# Patient Record
Sex: MALE | Race: WHITE | Employment: FULL TIME | URBAN - METROPOLITAN AREA
[De-identification: names, ages, dates, MRNs, and addresses within clinical notes are randomized per-mention and may not be internally consistent; named-entity substitution may affect disease eponyms.]

---
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
## 2019-03-11 DIAGNOSIS — E78.00 HYPERCHOLESTEROLEMIA: Primary | ICD-10-CM

## 2019-03-12 RX ORDER — ATORVASTATIN CALCIUM 20 MG/1
20 TABLET, FILM COATED ORAL DAILY
Qty: 30 TAB | Refills: 0 | Status: SHIPPED | OUTPATIENT
Start: 2019-03-12 | End: 2019-10-04

## 2019-05-01 ENCOUNTER — DOCUMENTATION ONLY (OUTPATIENT)
Dept: FAMILY MEDICINE CLINIC | Age: 19
End: 2019-05-01

## 2019-05-01 NOTE — PROGRESS NOTES
I reviewed and summarized note faxed from [address] [place] [address] ( DR. Wade Bolivar) on 4/11/19  Diffuse worsening muscle and joint pain: much better than before on Lyrica 75 mg bid. He recommended to also continue Amytriptilline 50 mg every day and Adderral  JOSE may always remain positive. F/u in 4 months    Concha Wyatt MD   05/01/19  3:04 PM

## 2019-07-05 ENCOUNTER — TELEPHONE (OUTPATIENT)
Dept: FAMILY MEDICINE CLINIC | Age: 19
End: 2019-07-05

## 2019-07-05 ENCOUNTER — TELEPHONE (OUTPATIENT)
Dept: CARDIOLOGY CLINIC | Age: 19
End: 2019-07-05

## 2019-07-05 DIAGNOSIS — E78.00 HYPERCHOLESTEROLEMIA: Primary | ICD-10-CM

## 2019-07-05 NOTE — TELEPHONE ENCOUNTER
Patient states he is due to have lipids lab work and has scheduled an appt for 8/23/19. He has asked if a lab order can be mailed to him (address confirmed).      Phone: [phone]

## 2019-07-05 NOTE — TELEPHONE ENCOUNTER
Spoke with patient who was informed an appointment was required as not seen here in the past year. Wednesday, July 10, 2019 08:00 AM  Dr. Holger Perez.

## 2019-07-05 NOTE — TELEPHONE ENCOUNTER
----- Message from Jeannie Guillen sent at 7/5/2019 12:15 PM EDT -----  Regarding: DR Sarita Platt / Dona Loaiza father is requesting a referral for an upcoming appt with:  Dr. Silvestre Arnav on 8/23/19 @ 9 am    [facility] [address] [id number]  p [phone]      Best contact: [phone]

## 2019-07-08 NOTE — TELEPHONE ENCOUNTER
Patient has upcoming appointment with Dr Holger Perez. ... Referral order to be entered for Cardiology after visit on July 10, 2019. ... Dr Holger Perez, please enter into [facility] for a yearly cardiology consult. ...

## 2019-07-10 ENCOUNTER — OFFICE VISIT (OUTPATIENT)
Dept: FAMILY MEDICINE CLINIC | Age: 19
End: 2019-07-10

## 2019-07-10 VITALS
WEIGHT: 208.2 LBS | RESPIRATION RATE: 16 BRPM | OXYGEN SATURATION: 99 % | BODY MASS INDEX: 33.46 KG/M2 | SYSTOLIC BLOOD PRESSURE: 135 MMHG | HEIGHT: 66 IN | DIASTOLIC BLOOD PRESSURE: 87 MMHG | HEART RATE: 79 BPM | TEMPERATURE: 98 F

## 2019-07-10 DIAGNOSIS — K90.41 GLUTEN INTOLERANCE: ICD-10-CM

## 2019-07-10 DIAGNOSIS — E78.5 DYSLIPIDEMIA: ICD-10-CM

## 2019-07-10 DIAGNOSIS — I10 HYPERTENSION, ESSENTIAL: Primary | ICD-10-CM

## 2019-07-10 RX ORDER — PANTOPRAZOLE SODIUM 20 MG/1
20 TABLET, DELAYED RELEASE ORAL DAILY
COMMUNITY

## 2019-07-10 RX ORDER — EPINEPHRINE 0.3 MG/.3ML
0.3 INJECTION SUBCUTANEOUS
Qty: 1 SYRINGE | Refills: 1 | Status: SHIPPED | OUTPATIENT
Start: 2019-07-10 | End: 2020-03-30 | Stop reason: SDUPTHER

## 2019-07-10 NOTE — PROGRESS NOTES
Identified Patient with two Patient identifiers (Name and ). Two Patient Identifiers confirmed. Reviewed record in preparation for visit and have obtained necessary documentation. Chief Complaint   Patient presents with    Referral Request     Referral to Cardiology - Dr. Addie Johnston Medication     Patient requesting prescription for Epi Pen due to Gluten Allergy        Visit Vitals  /87 (BP 1 Location: Left arm, BP Patient Position: Sitting)   Pulse 79   Temp 98 °F (36.7 °C) (Oral)   Resp 16   Ht 5' 6\" (1.676 m)   Wt 208 lb 3.2 oz (94.4 kg)   SpO2 99%   BMI 33.60 kg/m²       1. Have you been to the ER, urgent care clinic since your last visit? Hospitalized since your last visit? No    2. Have you seen or consulted any other health care providers outside of the [address] [place] [address] since your last visit? Include any pap smears or colon screening.  No

## 2019-07-10 NOTE — PROGRESS NOTES
Progress Note    Patient: Marisa Goodell MRN: [id number]  SSN: [id number]    YOB: 2000  Age: 20 y.o. Sex: male      Chief Complaint   Patient presents with    Referral Request     Referral to Cardiology - Dr. Amanda Kinney Medication     Patient requesting prescription for Epi Pen due to Gluten Allergy      Subjective:     Problems addressed:  Encounter Diagnoses     ICD-10-CM ICD-9-CM   1. Hypertension, essential I10 401.9   2. Dyslipidemia E78.5 272.4   3. Gluten intolerance K90.41 579.0     Pt is a  19 y/o M with HTN, HLD and ADHD who is seen in clinic today for Cardiology referral.   Pt was initially seen by a pediatric cardiologist for 5 years and then established care with Dr. Gilmer Snell.  Last office visit was 2/1/18: Personally reviewed office visit. HTN: On Lisinopril 2.5mg daily. Home BPs have been <711 systolic. Pt is compliant with his medications. Pt denies HA, dizziness, SOB, CP and LE edema. Dyslipidemia: LDL: 179 (2/2018). Currently Lipitor 20 mg. Tolerating medication well without myalgias. Gluten Allergies: Pt states that he had an anaphylactic reaction to gluten allergy. Had to receive treatment with EPI in the ER per Pt. Would like an epi-pen. Had colonoscopy in 6/2017 with Dr. Miriam Linder due to GI symptoms. Stopped eating gluten and his GI symptoms resolved. Current and past medical information:    Current Medications after this visit[de-identified]     Current Outpatient Medications   Medication Sig    pantoprazole (PROTONIX) 20 mg tablet Take 20 mg by mouth daily.  EPINEPHrine (EPIPEN) 0.3 mg/0.3 mL injection 0.3 mL by IntraMUSCular route once as needed for Anaphylaxis for up to 1 dose.  atorvastatin (LIPITOR) 20 mg tablet Take 1 Tab by mouth daily.  traMADol (ULTRAM) 50 mg tablet Take 1 Tab by mouth every eight (8) hours as needed for Pain for up to 10 doses.  Max Daily Amount: 150 mg.    dextroamphetamine-amphetamine (ADDERALL) 30 mg tablet Take 30 mg by mouth daily.  pregabalin (LYRICA) 25 mg capsule Take 25 mg by mouth two (2) times a day.  FEXOFENADINE HCL (ALLEGRA PO) Take  by mouth.  TREXIMET  mg tablet TK 1 T PO D PRN    amitriptyline (ELAVIL) 50 mg tablet Take 1 Tab by mouth nightly.  FENOFIBRATE PO Take  by mouth.  zonisamide (ZONEGRAN) 50 mg capsule Take 200 mg by mouth daily.  apremilast (OTEZLA) 30 mg tab Take 30 mg by mouth two (2) times a day.  lisinopril (PRINIVIL, ZESTRIL) 2.5 mg tablet Take 2.5 mg by mouth daily. No current facility-administered medications for this visit. Patient Active Problem List    Diagnosis Date Noted    Hypertension, essential 02/21/2018    Fibromyalgia 02/21/2018    IBS (irritable bowel syndrome)     Body aches 05/18/2017    Mild single current episode of major depressive disorder ([facility] [address] 05/18/2017       Past Medical History:   Diagnosis Date    ADHD (attention deficit hyperactivity disorder)     Fibromyalgia     GERD (gastroesophageal reflux disease)     Hypercholesteremia     Hypertension, essential 2/21/2018    IBS (irritable bowel syndrome)     Migraine     Psoriasis        Allergies   Allergen Reactions    Gluten Anaphylaxis    Topamax [Topiramate] Anaphylaxis    Zomig [Zolmitriptan] Shortness of Breath       Past Surgical History:   Procedure Laterality Date    COLONOSCOPY N/A 5/9/2017    COLONOSCOPY performed by Marilyne Sandifer, MD at [address] [place] [address] HX HEENT      wisdom teeth       Social History     Socioeconomic History    Marital status: SINGLE     Spouse name: Not on file    Number of children: Not on file    Years of education: Not on file    Highest education level: Not on file   Tobacco Use    Smoking status: Never Smoker    Smokeless tobacco: Never Used   Substance and Sexual Activity    Alcohol use: No    Drug use: No    Sexual activity: Never         Review of Systems   Constitutional: Negative for chills and fever.    Eyes: Negative for blurred vision. Respiratory: Negative for cough and shortness of breath. Cardiovascular: Negative for chest pain and palpitations. Gastrointestinal: Negative for abdominal pain, constipation, diarrhea, nausea and vomiting. Neurological: Negative for dizziness and headaches. Objective:     Vitals:    07/10/19 0803   BP: 135/87   Pulse: 79   Resp: 16   Temp: 98 °F (36.7 °C)   TempSrc: Oral   SpO2: 99%   Weight: 208 lb 3.2 oz (94.4 kg)   Height: 5' 6\" (1.676 m)      Body mass index is 33.6 kg/m². Physical Exam   Constitutional: He appears well-developed and well-nourished. No distress. Cardiovascular: Normal rate and regular rhythm. Exam reveals no gallop and no friction rub. No murmur heard. Pulmonary/Chest: Effort normal and breath sounds normal.   Skin: He is not diaphoretic. Health Maintenance Due   Topic Date Due    DTaP/Tdap/Td series (1 - Tdap) 02/05/2006    HPV Age 9Y-34Y (3 - Male 3-dose series) 02/05/2014     Assessment and orders:     Encounter Diagnoses     ICD-10-CM ICD-9-CM   1. Hypertension, essential I10 401.9   2. Dyslipidemia E78.5 272.4   3. Gluten intolerance K90.41 579.0     1. Hypertension, essential: Controlled and at goal in clinic today. Continue  Lisinopril 2.5mg daily.  - Follow up with cardiology as scheduled. - REFERRAL TO CARDIOLOGY    2. Dyslipidemia: LDL: 179 (2/2018). - Continue Lipitor 20 mg    3. Gluten intolerance: Unclear if Pt had an actual anaphylactic reaction but will prescribe Epipen as Pt is requesting.   - EPINEPHrine (EPIPEN) 0.3 mg/0.3 mL injection; 0.3 mL by IntraMUSCular route once as needed for Anaphylaxis for up to 1 dose. Plan of care:  Discussed diagnoses in detail with patient. Medication risks/benefits/side effects discussed with patient. All of the patient's questions were addressed. The patient understands and agrees with our plan of care.     The patient knows to call back if they are unsure of or forget any changes we discussed today or if the symptoms change. The patient received an After-Visit Summary which contains VS, orders, medication list and allergy list. This can be used as a \"mini-medical record\" should they have to seek medical care while out of town.       Future Appointments   Date Time Provider Conrad Delores   8/23/2019  9:00 AM Lise Gresham  [address]       Signed By: Aurora Aguayo MD     July 10, 2019      Pt discussed with Dr. Brent Borges

## 2019-07-18 LAB
ALBUMIN SERPL-MCNC: 4.6 G/DL (ref 3.5–5.5)
ALP SERPL-CCNC: 71 IU/L (ref 39–117)
ALT SERPL-CCNC: 40 IU/L (ref 0–44)
AST SERPL-CCNC: 21 IU/L (ref 0–40)
BILIRUB DIRECT SERPL-MCNC: 0.14 MG/DL (ref 0–0.4)
BILIRUB SERPL-MCNC: 0.8 MG/DL (ref 0–1.2)
CHOLEST SERPL-MCNC: 264 MG/DL (ref 100–199)
HDLC SERPL-MCNC: 28 MG/DL
INTERPRETATION, 910389: NORMAL
LDLC SERPL CALC-MCNC: 204 MG/DL (ref 0–99)
PROT SERPL-MCNC: 6.9 G/DL (ref 6–8.5)
TRIGL SERPL-MCNC: 158 MG/DL (ref 0–149)
VLDLC SERPL CALC-MCNC: 32 MG/DL (ref 5–40)

## 2019-07-18 NOTE — TELEPHONE ENCOUNTER
Numbers remain high  Has appt to discuss  Future Appointments  8/23/2019  9:00 AM    Corrinne Key, MD [address]

## 2019-08-27 ENCOUNTER — HOSPITAL ENCOUNTER (EMERGENCY)
Age: 19
Discharge: HOME | End: 2019-08-27
Attending: EMERGENCY MEDICINE
Payer: OTHER GOVERNMENT

## 2019-08-27 VITALS
TEMPERATURE: 98.5 F | RESPIRATION RATE: 20 BRPM | BODY MASS INDEX: 33.43 KG/M2 | HEIGHT: 66 IN | OXYGEN SATURATION: 98 % | HEART RATE: 82 BPM | WEIGHT: 208 LBS | DIASTOLIC BLOOD PRESSURE: 93 MMHG | SYSTOLIC BLOOD PRESSURE: 142 MMHG
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
## 2019-08-27 PROCEDURE — 99281 EMR DPT VST MAYX REQ PHY/QHP: CPT
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
## 2019-08-27 NOTE — LETTER
[address] [place] [address] Dr 
[facility] EMERGENCY DEPT 
[address] [facility] [id number] [phone] Work/School Note Date: 8/27/2019 To Whom It May concern: Vianey Gosselin was seen and treated today in the emergency room by the following provider(s): 
Attending Provider: Sulma Anton MD.   
// 
Jody Gosselin may return to work on 8/30/19. Sincerely, Lyndsey Michel MD 
 
 
/
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
## 2019-08-27 NOTE — ED NOTES
I have reviewed discharge instructions with the patient and parent. The patient and parent verbalized understanding instructions and need for follow up with primary care provider. Pt is not in any current distress and shows no evidence of clinical instability. Pt left ambulatory. Paperwork given and reviewed with patient and their parent, opportunity for questions/clarification given.      Visit Vitals  BP (!) 142/93 (BP 1 Location: Right arm, BP Patient Position: At rest)   Pulse 82   Temp 98.5 °F (36.9 °C)   Resp 20   Ht 5' 6\" (1.676 m)   Wt 94.3 kg (208 lb)   SpO2 98%   BMI 33.57 kg/m²

## 2019-08-27 NOTE — DISCHARGE INSTRUCTIONS

## 2019-10-04 ENCOUNTER — OFFICE VISIT (OUTPATIENT)
Dept: CARDIOLOGY CLINIC | Age: 19
End: 2019-10-04

## 2019-10-04 VITALS
DIASTOLIC BLOOD PRESSURE: 100 MMHG | RESPIRATION RATE: 16 BRPM | OXYGEN SATURATION: 97 % | HEIGHT: 66 IN | BODY MASS INDEX: 35.1 KG/M2 | SYSTOLIC BLOOD PRESSURE: 130 MMHG | WEIGHT: 218.4 LBS | HEART RATE: 96 BPM

## 2019-10-04 DIAGNOSIS — E78.79 FAMILIAL HYPERCHOLANEMIA: ICD-10-CM

## 2019-10-04 DIAGNOSIS — R00.0 TACHYCARDIA: ICD-10-CM

## 2019-10-04 DIAGNOSIS — M79.7 FIBROMYALGIA: ICD-10-CM

## 2019-10-04 DIAGNOSIS — I10 HYPERTENSION, ESSENTIAL: ICD-10-CM

## 2019-10-04 DIAGNOSIS — R07.2 SUBSTERNAL PRECORDIAL CHEST PAIN: ICD-10-CM

## 2019-10-04 DIAGNOSIS — E78.00 HYPERCHOLESTEROLEMIA: Primary | ICD-10-CM

## 2019-10-04 RX ORDER — ATORVASTATIN CALCIUM 40 MG/1
40 TABLET, FILM COATED ORAL DAILY
Qty: 90 TAB | Refills: 3 | Status: SHIPPED | OUTPATIENT
Start: 2019-10-04 | End: 2020-11-02 | Stop reason: SDUPTHER

## 2019-10-04 NOTE — PATIENT INSTRUCTIONS
Please increase your Liptor to 40mg daily. You may take 2 of your 20mg tablets until you get your new prescription. Please check your blood pressure 3 days a month and follow up with your PCP for your cholesterol and BP. When you check your blood pressure three times a month do it at different times. Be sure to make sure you are sitting still for at least five minutes with both feet flat on the floor and arm heart level. Please write down the blood pressure, heart rate, date and time. Bring the recording to your next PCP appointment.

## 2019-10-04 NOTE — PROGRESS NOTES
Vin Hayes     2000       Vipal K. Merlin Mantis MD, [facility] [place]  Date of Visit-10/4/2019   PCP is Yves Pulido MD   [facility] [place]  [facility]  HPI:  Vin Hayes is a 20 y.o. male   F/u of HTN and hypercholesterolemia. Has transferred from his pediatric cardiologist but then has not seen us in awhile and missed appts  Was in [address]- and reminded to fu   Has been off some of his medications now as compared to last visit  Prior stress test in 2019 was normal, done because of chest pain. Previous lipids have been markedly elevated. Has a hx of fibromyalgia. Not seen since since February 2018. Lipids as below:  Lab Results   Component Value Date/Time    Cholesterol, total 264 (H) 07/17/2019 10:35 AM    HDL Cholesterol 28 (L) 07/17/2019 10:35 AM    LDL, calculated 204 (H) 07/17/2019 10:35 AM    VLDL, calculated 32 07/17/2019 10:35 AM    Triglyceride 158 (H) 07/17/2019 10:35 AM     Lab Results   Component Value Date/Time    Cholesterol, total 264 (H) 07/17/2019 10:35 AM    Cholesterol, total 242 (H) 08/09/2018 01:02 PM    HDL Cholesterol 28 (L) 07/17/2019 10:35 AM    HDL Cholesterol 36 (L) 08/09/2018 01:02 PM    LDL, calculated 204 (H) 07/17/2019 10:35 AM    LDL, calculated 179 (H) 08/09/2018 01:02 PM    Triglyceride 158 (H) 07/17/2019 10:35 AM    Triglyceride 136 (H) 08/09/2018 01:02 PM     Pt reports that he has been feeling well. He notes that he occasionally checks his blood pressure at home with readings around 120. He has been off some of his anxiety meds with direction of physicians but also now off bp meds and not clear to me if with his own choice or physician direction  His anxiety level seems lower, he is working at [employer] and is now active  Denies chest pain, edema, syncope or shortness of breath at rest   Has no tachycardia , palpitations or sense of arrythmia     EKG: NSR WNL. Assessment/Plan:     1. Hypercholesterolemia  Increase lipitor from 20 to 40mg . He has a likely familial dyslipidemia likely. I will have him see Maurizio Dacosta MD and then can be referred back if he does not have improvement in LDL as his statin is increased. I would suggest that he work with his PCP to increase the Lipitor or even change to Crestor to get an LDL less than 130 . Either he gets to an LDL below 130 or if he is maxed out on high does Crestor I will see him if he is not at goal to discuss PCSK9 inhibitors    family hx of MI (great aunt) no close relatives, he also has a family hx of hypercholesteremia. 2. Hypertension, essential  BP has improved. No longer on meds. I've asked him to take his BP 3 times a month with a goal of 424 mmHg systolic or less. - AMB POC EKG ROUTINE W/ 12 LEADS, INTER & REP    3. Chest pain, referred by Dr. Svetlana Bradford from [facility] for chest wall pain, seen  with normal stress echo  4. ADHD , keep fu with PCP  Has mild tachycardia with Adderall  5,. Hx of fibromyalgia, likely source of previous chest pain    F/u as outlined above PRN but happy to see back from PCP for any issue but especially if unable to get LDL <130. Patient Instructions   Please increase your Liptor to 40mg daily. You may take 2 of your 20mg tablets until you get your new prescription. Please check your blood pressure 3 days a month and follow up with your PCP for your cholesterol and BP. When you check your blood pressure three times a month do it at different times. Be sure to make sure you are sitting still for at least five minutes with both feet flat on the floor and arm heart level. Please write down the blood pressure, heart rate, date and time. Bring the recording to your next PCP appointment. Key CAD CHF Meds             atorvastatin (LIPITOR) 40 mg tablet (Taking) Take 1 Tab by mouth daily. lisinopril (PRINIVIL, ZESTRIL) 2.5 mg tablet (Taking) Take 2.5 mg by mouth daily. Impression:   1. Hypercholesterolemia    2. Hypertension, essential    3. Substernal precordial chest pain    4. Familial hypercholanemia    5. Fibromyalgia    6. Tachycardia       Cardiac History:   No specialty comments available. ROS-except as noted above. . A complete cardiac and respiratory are reviewed and negative except as above ; Resp-denies wheezing  or productive cough,. Const- No unusual weight loss or fever; Neuro-no recent seizure or CVA ; GI- No BRBPR, abdom pain, bloating ; - no  hematuria   see supplement sheet, initialed and to be scanned by staff  Past Medical History:   Diagnosis Date    ADHD (attention deficit hyperactivity disorder)     Fibromyalgia     GERD (gastroesophageal reflux disease)     Hypercholesteremia     Hypertension, essential 2/21/2018    IBS (irritable bowel syndrome)     Migraine     Psoriasis       Social Hx= reports that he has never smoked. He has never used smokeless tobacco. He reports that he does not drink alcohol or use drugs. Exam and Labs:    Visit Vitals  BP (!) 130/100 (BP 1 Location: Left arm, BP Patient Position: Sitting)   Pulse 96   Resp 16   Ht 5' 6\" (1.676 m)   Wt 218 lb 6.4 oz (99.1 kg)   SpO2 97%   BMI 35.25 kg/m²    @Constitutional:  NAD, comfortable  Head: NC,AT. Eyes: No scleral icterus. Neck:  Neck supple. No JVD present. Throat: moist mucous membranes. Chest: Effort normal & normal respiratory excursion . Neurological: alert, conversant and oriented . Skin: Skin is not cold. No obvious systemic rash noted. Not diaphoretic. No erythema. Psychiatric:  Grossly normal mood and affect. Behavior appears normal. Extremities:  no clubbing or cyanosis. Abdomen: non distended    Lungs:  breath sounds normal. No stridor. distress, wheezes or  Rales. Heart:  normal rate, regular rhythm, normal S1, S2, no murmurs, rubs, clicks or gallops, PMI non displaced. Edema:  Edema is none.   Lab Results   Component Value Date/Time    Cholesterol, total 264 (H) 07/17/2019 10:35 AM    HDL Cholesterol 28 (L) 07/17/2019 10:35 AM    LDL, calculated 204 (H) 07/17/2019 10:35 AM    Triglyceride 158 (H) 07/17/2019 10:35 AM     Lab Results   Component Value Date/Time    Sodium 142 03/25/2018 10:15 AM    Potassium 4.2 03/25/2018 10:15 AM    Chloride 107 03/25/2018 10:15 AM    CO2 27 03/25/2018 10:15 AM    Anion gap 8 03/25/2018 10:15 AM    Glucose 88 03/25/2018 10:15 AM    BUN 20 03/25/2018 10:15 AM    Creatinine 1.22 03/25/2018 10:15 AM    BUN/Creatinine ratio 16 03/25/2018 10:15 AM    GFR est AA >60 03/25/2018 10:15 AM    GFR est non-AA >60 03/25/2018 10:15 AM    Calcium 8.9 03/25/2018 10:15 AM      Wt Readings from Last 3 Encounters:   10/04/19 218 lb 6.4 oz (99.1 kg)   08/27/19 208 lb (94.3 kg)   07/10/19 208 lb 3.2 oz (94.4 kg)      BP Readings from Last 3 Encounters:   10/04/19 (!) 130/100   08/27/19 (!) 142/93   07/10/19 135/87      Current Outpatient Medications   Medication Sig    cholecalciferol, vitamin D3, (VITAMIN D3 PO) Take 1 Tab by mouth daily.  pantoprazole (PROTONIX) 20 mg tablet Take 20 mg by mouth daily.  atorvastatin (LIPITOR) 20 mg tablet Take 1 Tab by mouth daily.  pregabalin (LYRICA) 25 mg capsule Take 25 mg by mouth two (2) times a day. As of 10-4-19,taking 50 mg bid.  TREXIMET  mg tablet TK 1 T PO D PRN    FEXOFENADINE HCL (ALLEGRA PO) Take 1 Tab by mouth daily as needed.  lisinopril (PRINIVIL, ZESTRIL) 2.5 mg tablet Take 2.5 mg by mouth daily.  dextroamphetamine-amphetamine (ADDERALL) 30 mg tablet Take 30 mg by mouth daily. No current facility-administered medications for this visit. Impression see above.      Written by Amber Galvez, as dictated by Ramesh Osborne MD.
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
## 2019-10-04 NOTE — Clinical Note
10/4/19 Patient: Vin Hayes YOB: 2000 Date of Visit: 10/4/2019 Orvilla Lesch, MD [address] [place] [id number] [facility] [phone] VIA In Basket Dear Orvilla Lesch, MD, Thank you for referring Mr. Vin Hayes to [facility] for evaluation. My notes for this consultation are attached. If you have questions, please do not hesitate to call me. I look forward to following your patient along with you.  
 
 
Sincerely, 
 
Bell Child MD

## 2020-03-30 ENCOUNTER — TELEPHONE (OUTPATIENT)
Dept: FAMILY MEDICINE CLINIC | Age: 20
End: 2020-03-30

## 2020-03-30 DIAGNOSIS — K90.41 GLUTEN INTOLERANCE: ICD-10-CM

## 2020-03-30 RX ORDER — EPINEPHRINE 0.3 MG/.3ML
0.3 INJECTION SUBCUTANEOUS
Qty: 2 SYRINGE | Refills: 1 | Status: SHIPPED | OUTPATIENT
Start: 2020-03-30 | End: 2020-03-30

## 2020-03-30 NOTE — TELEPHONE ENCOUNTER
I called and spoke with patient has a urology appt tomorrow and if need be depending on how appt goes he may need follow up here in clinic. He will be in touch if this is necessary. He does need a refill of his epi-pen asap. Will pend to you.

## 2020-03-30 NOTE — TELEPHONE ENCOUNTER
Patient is having abdominal pain, not sure if its due to the kidney stones. Also wants to discuss meds. Needs an epi pen. Would like to be seen soon as possible. Please advise.

## 2020-04-14 ENCOUNTER — HOSPITAL ENCOUNTER (OUTPATIENT)
Dept: LAB | Age: 20
Discharge: HOME | End: 2020-04-14

## 2020-04-14 ENCOUNTER — OFFICE VISIT (OUTPATIENT)
Dept: FAMILY MEDICINE CLINIC | Age: 20
End: 2020-04-14

## 2020-04-14 VITALS
RESPIRATION RATE: 16 BRPM | HEART RATE: 97 BPM | DIASTOLIC BLOOD PRESSURE: 77 MMHG | OXYGEN SATURATION: 97 % | TEMPERATURE: 98.4 F | BODY MASS INDEX: 36.8 KG/M2 | SYSTOLIC BLOOD PRESSURE: 128 MMHG | WEIGHT: 229 LBS | HEIGHT: 66 IN

## 2020-04-14 DIAGNOSIS — R10.31 RIGHT LOWER QUADRANT ABDOMINAL PAIN: Primary | ICD-10-CM

## 2020-04-14 DIAGNOSIS — R10.31 RIGHT LOWER QUADRANT ABDOMINAL PAIN: ICD-10-CM

## 2020-04-14 LAB
BASOPHILS # BLD: 0.1 K/UL (ref 0–0.1)
BASOPHILS NFR BLD: 1 % (ref 0–1)
BILIRUB UR QL STRIP: NEGATIVE
DIFFERENTIAL METHOD BLD: NORMAL
EOSINOPHIL # BLD: 0.1 K/UL (ref 0–0.4)
EOSINOPHIL NFR BLD: 1 % (ref 0–7)
ERYTHROCYTE [DISTWIDTH] IN BLOOD BY AUTOMATED COUNT: 12.5 % (ref 11.5–14.5)
GLUCOSE UR-MCNC: NEGATIVE MG/DL
HCT VFR BLD AUTO: 47.5 % (ref 36.6–50.3)
HGB BLD-MCNC: 16.3 G/DL (ref 12.1–17)
IMM GRANULOCYTES # BLD AUTO: 0 K/UL (ref 0–0.04)
IMM GRANULOCYTES NFR BLD AUTO: 0 % (ref 0–0.5)
KETONES P FAST UR STRIP-MCNC: NEGATIVE MG/DL
LYMPHOCYTES # BLD: 2.7 K/UL (ref 0.8–3.5)
LYMPHOCYTES NFR BLD: 32 % (ref 12–49)
MCH RBC QN AUTO: 30.6 PG (ref 26–34)
MCHC RBC AUTO-ENTMCNC: 34.3 G/DL (ref 30–36.5)
MCV RBC AUTO: 89.3 FL (ref 80–99)
MONOCYTES # BLD: 0.6 K/UL (ref 0–1)
MONOCYTES NFR BLD: 7 % (ref 5–13)
NEUTS SEG # BLD: 5 K/UL (ref 1.8–8)
NEUTS SEG NFR BLD: 59 % (ref 32–75)
NRBC # BLD: 0 K/UL (ref 0–0.01)
NRBC BLD-RTO: 0 PER 100 WBC
PH UR STRIP: 8 [PH] (ref 4.6–8)
PLATELET # BLD AUTO: 296 K/UL (ref 150–400)
PMV BLD AUTO: 11.2 FL (ref 8.9–12.9)
PROT UR QL STRIP: NEGATIVE
RBC # BLD AUTO: 5.32 M/UL (ref 4.1–5.7)
SP GR UR STRIP: 1.02 (ref 1–1.03)
UA UROBILINOGEN AMB POC: NORMAL (ref 0.2–1)
URINALYSIS CLARITY POC: CLEAR
URINALYSIS COLOR POC: YELLOW
URINE BLOOD POC: NEGATIVE
URINE LEUKOCYTES POC: NEGATIVE
URINE NITRITES POC: NEGATIVE
WBC # BLD AUTO: 8.5 K/UL (ref 4.1–11.1)

## 2020-04-14 RX ORDER — DICLOFENAC SODIUM 50 MG/1
TABLET, DELAYED RELEASE ORAL
COMMUNITY
Start: 2020-04-11 | End: 2021-07-21 | Stop reason: ALTCHOICE

## 2020-04-14 RX ORDER — PREGABALIN 75 MG/1
75 CAPSULE ORAL 2 TIMES DAILY
COMMUNITY
Start: 2020-01-13

## 2020-04-14 NOTE — PATIENT INSTRUCTIONS

## 2020-04-14 NOTE — PROGRESS NOTES
Diane Chaparro  20 y.o. male  2000  [id number] [phone] [id number]   [address]: Progress Note  William Washington MD       Encounter Date: 4/14/2020    Chief Complaint   Patient presents with    Abdominal Pain     x2weeks pain 5     History of Present Illness   Diane Chaparro is a 23 y.o. male who presents to clinic today for 2 weeks of right sided abdominal pain. Initially started with hematuria, flank and groin pain similar to prior kidney stones. Went to the urgent care on 3/30 and then was seen by the Urologist 5 days after sx started and had a CT scan done (see scanned). This was negative for kidney stones but did demonstrate an appendolithiasis without evidence of appendicitis. Patient has continued to have right-sided abdominal pain which is described as intense and sharp lasting from several seconds to upwards of 30 minutes. The pain will resolve on its own without intervention. Associated sx include nausea and diarrhea (dark). Denies gross hematochezia, additional hematuria, fevers or chills. Urlogy CT was negative for nephrolithiasis, but did show a \"high density within the appendix is consistent with appendicolith (s)\"  Review of Systems   Review of Systems   Constitutional: Negative for chills and fever. Gastrointestinal: Positive for abdominal pain, diarrhea, heartburn and nausea. Negative for blood in stool and constipation. Melena: ?   Genitourinary: Flank pain: resolved. Hematuria: resolved. Vitals/Objective:     Vitals:    04/14/20 1307   BP: 128/77   Pulse: 97   Resp: 16   Temp: 98.4 °F (36.9 °C)   TempSrc: Oral   SpO2: 97%   Weight: 229 lb (103.9 kg)   Height: 5' 6\" (1.676 m)     Body mass index is 36.96 kg/m². General: Patient alert and oriented and in NAD  Heart: Regular rate and rhythm, No murmurs, rubs or gallops.   2+ peripheral pulses  Lungs: Clear to auscultation bilaterally, no wheezing, rales or rhonchi  Abd: decreased bowel sounds, right sided tenderness, no guarding or rebound.  + Rovsing. Neg Obturator. Able to go from supine to seated without significant pain  Psych: Appropriate mood and affect      Assessment and Plan:   1. Right lower quadrant abdominal pain  DDX: sequela of recent nephrolithiasis, indolent sub acute appendicitis, incidental appendolithiasis, gastroenteritis, diverticulitis. Will repeat UA with micro and CBC today. Given precautions as to when to seek emergency care. Will also reach out to surgery regarding possible options should his pain persist with known stone. - CBC WITH AUTOMATED DIFF; Future  - AMB POC URINALYSIS DIP STICK AUTO W/ MICRO      I have discussed the diagnosis with the patient and the intended plan as seen in the above orders. he has expressed understanding. The patient has received an after-visit summary and questions were answered concerning future plans. I have discussed medication side effects and warnings with the patient as well. Electronically Signed: Anatoliy Coronado MD     History   Patients past medical, surgical and family histories were reviewed and updated. Past Medical History:   Diagnosis Date    ADHD (attention deficit hyperactivity disorder)     Calculus of kidney     2018    Fibromyalgia     GERD (gastroesophageal reflux disease)     Hypercholesteremia     Hypertension, essential 2/21/2018    IBS (irritable bowel syndrome)     Migraine     Psoriasis      Past Surgical History:   Procedure Laterality Date    COLONOSCOPY N/A 5/9/2017    COLONOSCOPY performed by Anurag Esparza MD at [address] [place] [address] HX HEENT      wisdom teeth     No family history on file.   Social History     Socioeconomic History    Marital status: SINGLE     Spouse name: Not on file    Number of children: Not on file    Years of education: Not on file    Highest education level: Not on file   Occupational History    Not on file Social Needs    Financial resource strain: Not on file    Food insecurity     Worry: Not on file     Inability: Not on file    Transportation needs     Medical: Not on file     Non-medical: Not on file   Tobacco Use    Smoking status: Never Smoker    Smokeless tobacco: Never Used   Substance and Sexual Activity    Alcohol use: No    Drug use: No    Sexual activity: Never   Lifestyle    Physical activity     Days per week: Not on file     Minutes per session: Not on file    Stress: Not on file   Relationships    Social connections     Talks on phone: Not on file     Gets together: Not on file     Attends Je[employer]'s witness service: Not on file     Active member of club or organization: Not on file     Attends meetings of clubs or organizations: Not on file     Relationship status: Not on file    Intimate partner violence     Fear of current or ex partner: Not on file     Emotionally abused: Not on file     Physically abused: Not on file     Forced sexual activity: Not on file   Other Topics Concern    Not on file   Social History Narrative    Not on file            Current Medications/Allergies     Current Outpatient Medications   Medication Sig Dispense Refill    cholecalciferol, vitamin D3, (VITAMIN D3 PO) Take 1 Tab by mouth daily.  atorvastatin (LIPITOR) 40 mg tablet Take 1 Tab by mouth daily. 90 Tab 3    pantoprazole (PROTONIX) 20 mg tablet Take 20 mg by mouth daily.  dextroamphetamine-amphetamine (ADDERALL) 30 mg tablet Take 30 mg by mouth daily.  pregabalin (LYRICA) 25 mg capsule Take 25 mg by mouth two (2) times a day. As of 10-4-19,taking 50 mg bid.  TREXIMET  mg tablet TK 1 T PO D PRN  3    FEXOFENADINE HCL (ALLEGRA PO) Take 1 Tab by mouth daily as needed.  lisinopril (PRINIVIL, ZESTRIL) 2.5 mg tablet Take 2.5 mg by mouth daily.        Allergies   Allergen Reactions    Gluten Anaphylaxis    Topamax [Topiramate] Anaphylaxis    Zomig [Zolmitriptan] Shortness of Breath

## 2020-04-14 NOTE — PROGRESS NOTES
Chief Complaint   Patient presents with    Abdominal Pain     x2weeks pain 5     Visit Vitals  /77 (BP 1 Location: Left arm, BP Patient Position: Sitting)   Pulse 97   Temp 98.4 °F (36.9 °C) (Oral)   Resp 16   Ht 5' 6\" (1.676 m)   Wt 229 lb (103.9 kg)   SpO2 97%   BMI 36.96 kg/m²     1. Have you been to the ER, urgent care clinic since your last visit? Hospitalized since your last visit? Urgent care march 30th Kidney stones     2. Have you seen or consulted any other health care providers outside of the [address] [place] [address] since your last visit? Include any pap smears or colon screening.  No

## 2020-04-20 ENCOUNTER — PATIENT MESSAGE (OUTPATIENT)
Dept: FAMILY MEDICINE CLINIC | Age: 20
End: 2020-04-20

## 2020-04-22 ENCOUNTER — APPOINTMENT (OUTPATIENT)
Dept: CT IMAGING | Age: 20
End: 2020-04-22
Attending: NURSE PRACTITIONER
Payer: COMMERCIAL

## 2020-04-22 ENCOUNTER — HOSPITAL ENCOUNTER (EMERGENCY)
Age: 20
Discharge: HOME | End: 2020-04-22
Attending: STUDENT IN AN ORGANIZED HEALTH CARE EDUCATION/TRAINING PROGRAM | Admitting: STUDENT IN AN ORGANIZED HEALTH CARE EDUCATION/TRAINING PROGRAM
Payer: COMMERCIAL

## 2020-04-22 VITALS
HEART RATE: 94 BPM | SYSTOLIC BLOOD PRESSURE: 109 MMHG | HEIGHT: 66 IN | BODY MASS INDEX: 35.36 KG/M2 | TEMPERATURE: 97.5 F | RESPIRATION RATE: 15 BRPM | WEIGHT: 220 LBS | OXYGEN SATURATION: 94 % | DIASTOLIC BLOOD PRESSURE: 76 MMHG

## 2020-04-22 DIAGNOSIS — R10.84 ABDOMINAL PAIN, GENERALIZED: Primary | ICD-10-CM

## 2020-04-22 LAB
ALBUMIN SERPL-MCNC: 3.7 G/DL (ref 3.5–5)
ALBUMIN/GLOB SERPL: 1.1 {RATIO} (ref 1.1–2.2)
ALP SERPL-CCNC: 78 U/L (ref 45–117)
ALT SERPL-CCNC: 71 U/L (ref 12–78)
ANION GAP SERPL CALC-SCNC: 8 MMOL/L (ref 5–15)
APPEARANCE UR: CLEAR
AST SERPL-CCNC: 27 U/L (ref 15–37)
BACTERIA URNS QL MICRO: NEGATIVE /HPF
BASOPHILS # BLD: 0 K/UL (ref 0–0.1)
BASOPHILS NFR BLD: 0 % (ref 0–1)
BILIRUB SERPL-MCNC: 1.7 MG/DL (ref 0.2–1)
BILIRUB UR QL: NEGATIVE
BUN SERPL-MCNC: 14 MG/DL (ref 6–20)
BUN/CREAT SERPL: 15 (ref 12–20)
CALCIUM SERPL-MCNC: 8.7 MG/DL (ref 8.5–10.1)
CHLORIDE SERPL-SCNC: 108 MMOL/L (ref 97–108)
CO2 SERPL-SCNC: 23 MMOL/L (ref 21–32)
COLOR UR: NORMAL
COMMENT, HOLDF: NORMAL
CREAT SERPL-MCNC: 0.94 MG/DL (ref 0.7–1.3)
DIFFERENTIAL METHOD BLD: NORMAL
EOSINOPHIL # BLD: 0.1 K/UL (ref 0–0.4)
EOSINOPHIL NFR BLD: 1 % (ref 0–7)
EPITH CASTS URNS QL MICRO: NORMAL /LPF
ERYTHROCYTE [DISTWIDTH] IN BLOOD BY AUTOMATED COUNT: 12.4 % (ref 11.5–14.5)
GLOBULIN SER CALC-MCNC: 3.3 G/DL (ref 2–4)
GLUCOSE SERPL-MCNC: 92 MG/DL (ref 65–100)
GLUCOSE UR STRIP.AUTO-MCNC: NEGATIVE MG/DL
HCT VFR BLD AUTO: 44 % (ref 36.6–50.3)
HGB BLD-MCNC: 16 G/DL (ref 12.1–17)
HGB UR QL STRIP: NEGATIVE
HYALINE CASTS URNS QL MICRO: NORMAL /LPF (ref 0–5)
IMM GRANULOCYTES # BLD AUTO: 0 K/UL (ref 0–0.04)
IMM GRANULOCYTES NFR BLD AUTO: 0 % (ref 0–0.5)
KETONES UR QL STRIP.AUTO: NEGATIVE MG/DL
LEUKOCYTE ESTERASE UR QL STRIP.AUTO: NEGATIVE
LIPASE SERPL-CCNC: 79 U/L (ref 73–393)
LYMPHOCYTES # BLD: 2 K/UL (ref 0.8–3.5)
LYMPHOCYTES NFR BLD: 26 % (ref 12–49)
MCH RBC QN AUTO: 31.4 PG (ref 26–34)
MCHC RBC AUTO-ENTMCNC: 36.4 G/DL (ref 30–36.5)
MCV RBC AUTO: 86.3 FL (ref 80–99)
MONOCYTES # BLD: 0.5 K/UL (ref 0–1)
MONOCYTES NFR BLD: 7 % (ref 5–13)
NEUTS SEG # BLD: 5.1 K/UL (ref 1.8–8)
NEUTS SEG NFR BLD: 66 % (ref 32–75)
NITRITE UR QL STRIP.AUTO: NEGATIVE
NRBC # BLD: 0 K/UL (ref 0–0.01)
NRBC BLD-RTO: 0 PER 100 WBC
PH UR STRIP: 6 [PH] (ref 5–8)
PLATELET # BLD AUTO: 251 K/UL (ref 150–400)
PMV BLD AUTO: 10.2 FL (ref 8.9–12.9)
POTASSIUM SERPL-SCNC: 3.9 MMOL/L (ref 3.5–5.1)
PROT SERPL-MCNC: 7 G/DL (ref 6.4–8.2)
PROT UR STRIP-MCNC: NEGATIVE MG/DL
RBC # BLD AUTO: 5.1 M/UL (ref 4.1–5.7)
RBC #/AREA URNS HPF: NORMAL /HPF (ref 0–5)
SAMPLES BEING HELD,HOLD: NORMAL
SODIUM SERPL-SCNC: 139 MMOL/L (ref 136–145)
SP GR UR REFRACTOMETRY: 1.02 (ref 1–1.03)
UR CULT HOLD, URHOLD: NORMAL
UROBILINOGEN UR QL STRIP.AUTO: 0.2 EU/DL (ref 0.2–1)
WBC # BLD AUTO: 7.8 K/UL (ref 4.1–11.1)
WBC URNS QL MICRO: NORMAL /HPF (ref 0–4)

## 2020-04-22 PROCEDURE — 80053 COMPREHEN METABOLIC PANEL: CPT

## 2020-04-22 PROCEDURE — 74177 CT ABD & PELVIS W/CONTRAST: CPT

## 2020-04-22 PROCEDURE — 85025 COMPLETE CBC W/AUTO DIFF WBC: CPT

## 2020-04-22 PROCEDURE — 36415 COLL VENOUS BLD VENIPUNCTURE: CPT

## 2020-04-22 PROCEDURE — 81001 URINALYSIS AUTO W/SCOPE: CPT

## 2020-04-22 PROCEDURE — 74011250636 HC RX REV CODE- 250/636: Performed by: NURSE PRACTITIONER

## 2020-04-22 PROCEDURE — 96375 TX/PRO/DX INJ NEW DRUG ADDON: CPT

## 2020-04-22 PROCEDURE — 96361 HYDRATE IV INFUSION ADD-ON: CPT

## 2020-04-22 PROCEDURE — 96374 THER/PROPH/DIAG INJ IV PUSH: CPT

## 2020-04-22 PROCEDURE — 99283 EMERGENCY DEPT VISIT LOW MDM: CPT

## 2020-04-22 PROCEDURE — 83690 ASSAY OF LIPASE: CPT

## 2020-04-22 PROCEDURE — 74011636320 HC RX REV CODE- 636/320: Performed by: RADIOLOGY

## 2020-04-22 RX ORDER — ONDANSETRON 2 MG/ML
4 INJECTION INTRAMUSCULAR; INTRAVENOUS
Status: COMPLETED | OUTPATIENT
Start: 2020-04-22 | End: 2020-04-22

## 2020-04-22 RX ORDER — DICYCLOMINE HYDROCHLORIDE 10 MG/1
10 CAPSULE ORAL 4 TIMES DAILY
Qty: 20 CAP | Refills: 0 | Status: SHIPPED | OUTPATIENT
Start: 2020-04-22 | End: 2020-04-27

## 2020-04-22 RX ORDER — AMITRIPTYLINE HYDROCHLORIDE 100 MG/1
100 TABLET, FILM COATED ORAL
COMMUNITY
End: 2021-08-15 | Stop reason: ALTCHOICE

## 2020-04-22 RX ORDER — KETOROLAC TROMETHAMINE 30 MG/ML
30 INJECTION, SOLUTION INTRAMUSCULAR; INTRAVENOUS
Status: COMPLETED | OUTPATIENT
Start: 2020-04-22 | End: 2020-04-22

## 2020-04-22 RX ORDER — ONDANSETRON 4 MG/1
4 TABLET, ORALLY DISINTEGRATING ORAL
Qty: 20 TAB | Refills: 0 | Status: SHIPPED | OUTPATIENT
Start: 2020-04-22 | End: 2021-08-15 | Stop reason: ALTCHOICE

## 2020-04-22 RX ADMIN — SODIUM CHLORIDE 1000 ML: 900 INJECTION, SOLUTION INTRAVENOUS at 09:42

## 2020-04-22 RX ADMIN — IOPAMIDOL 100 ML: 755 INJECTION, SOLUTION INTRAVENOUS at 09:24

## 2020-04-22 RX ADMIN — ONDANSETRON 4 MG: 2 INJECTION INTRAMUSCULAR; INTRAVENOUS at 09:42

## 2020-04-22 RX ADMIN — KETOROLAC TROMETHAMINE 30 MG: 30 INJECTION, SOLUTION INTRAMUSCULAR at 09:42

## 2020-04-22 NOTE — TELEPHONE ENCOUNTER
From: Dean Rice MD  To: Aneudy Carlton  Sent: 4/20/2020 10:13 PM EDT  Subject: Follow-up on abdominal pain    Hi Sveta Romero,    I was just checking in to see how you were doing. How is your abdominal pain? Have things improved? Send us a [employer] message with any updates.      Talk to you soon,    Leta Leventhal, MD

## 2020-04-22 NOTE — LETTER
NOTIFICATION RETURN TO WORK / SCHOOL 
 
4/22/2020 10:57 AM 
 
 [address] [id number] To Whom It May Concern: [facility] LINDSEY is currently under the care of [facility] EMERGENCY DEPT. He will return to work on: April 23, 2020 If there are questions or concerns please have the patient contact our office.  
 
 
 
Sincerely, 
 
 
 
James López NP 
10:58 AM

## 2020-04-22 NOTE — ED PROVIDER NOTES
The patient is a 25-year-old male who presented ambulatory to the emergency room with complaints of right lower quadrant abdominal pain that is been ongoing for the last 2 weeks. He reports that 4 weeks ago he passed a kidney stone has been in the care of urology. Completed a CT scan at that time and was found to have a \"stone in his appendix\". Patient states that the pain has been coming more severe. Associated symptoms include nausea, vomiting, diarrhea, and dysuria. He has been taking Motrin without relief. Activity movement and palpation make the pain worse. No sick contacts. No travel outside the [place], cough, fevers, known exposure to positive COVID-19. Pt denies fevers, chills, night sweats, chest pain, pressure, SOB, MILLER, PND, orthopnea, melena, hematuria, constipation, HA, dizziness, and syncope. Past Medical History:   Diagnosis Date    ADHD (attention deficit hyperactivity disorder)     Calculus of kidney     2018    Fibromyalgia     GERD (gastroesophageal reflux disease)     Hypercholesteremia     Hypertension, essential 2/21/2018    IBS (irritable bowel syndrome)     Migraine     Psoriasis        Past Surgical History:   Procedure Laterality Date    COLONOSCOPY N/A 5/9/2017    COLONOSCOPY performed by Yinka Catherine MD at [facility]      wisdom teeth         No family history on file.     Social History     Socioeconomic History    Marital status: SINGLE     Spouse name: Not on file    Number of children: Not on file    Years of education: Not on file    Highest education level: Not on file   Occupational History    Not on file   Social Needs    Financial resource strain: Not on file    Food insecurity     Worry: Not on file     Inability: Not on file    Transportation needs     Medical: Not on file     Non-medical: Not on file   Tobacco Use    Smoking status: Never Smoker    Smokeless tobacco: Never Used   Substance and Sexual Activity    Alcohol use: No    Drug use: No    Sexual activity: Never   Lifestyle    Physical activity     Days per week: Not on file     Minutes per session: Not on file    Stress: Not on file   Relationships    Social connections     Talks on phone: Not on file     Gets together: Not on file     Attends Holiness service: Not on file     Active member of club or organization: Not on file     Attends meetings of clubs or organizations: Not on file     Relationship status: Not on file    Intimate partner violence     Fear of current or ex partner: Not on file     Emotionally abused: Not on file     Physically abused: Not on file     Forced sexual activity: Not on file   Other Topics Concern    Not on file   Social History Narrative    Not on file         ALLERGIES: Gluten; Topamax [topiramate]; and Zomig [zolmitriptan]    Review of Systems   Constitutional: Negative for activity change, appetite change, chills, diaphoresis, fatigue, fever and unexpected weight change. HENT: Negative for congestion, ear pain, rhinorrhea, sinus pressure, sore throat, tinnitus, trouble swallowing and voice change. Eyes: Negative for pain, discharge, redness and visual disturbance. Respiratory: Negative for apnea, cough, choking, chest tightness, shortness of breath, wheezing and stridor. Cardiovascular: Negative for chest pain, palpitations and leg swelling. Gastrointestinal: Positive for abdominal pain, diarrhea, nausea and vomiting. Negative for constipation. Endocrine: Negative for cold intolerance and heat intolerance. Genitourinary: Positive for dysuria. Negative for difficulty urinating, flank pain, hematuria, testicular pain and urgency. Musculoskeletal: Negative for arthralgias, back pain, gait problem, joint swelling, myalgias, neck pain and neck stiffness. Skin: Negative for color change, pallor, rash and wound. Allergic/Immunologic: Negative for immunocompromised state.    Neurological: Negative for dizziness, tremors, syncope, weakness, light-headedness, numbness and headaches. Hematological: Does not bruise/bleed easily. Psychiatric/Behavioral: Negative for agitation, confusion and suicidal ideas. Vitals:    04/22/20 0852   BP: 167/84   Pulse: 94   Resp: 15   Temp: 97.5 °F (36.4 °C)   SpO2: 100%   Weight: 99.8 kg (220 lb)   Height: 5' 6\" (1.676 m)            Physical Exam  Vitals signs and nursing note reviewed. Constitutional:       General: He is not in acute distress. Appearance: He is well-developed. He is not diaphoretic. HENT:      Head: Atraumatic. Nose: Nose normal.      Mouth/Throat:      Pharynx: No oropharyngeal exudate. Eyes:      General: No scleral icterus. Right eye: No discharge. Left eye: No discharge. Conjunctiva/sclera: Conjunctivae normal.   Neck:      Musculoskeletal: Normal range of motion and neck supple. Thyroid: No thyromegaly. Vascular: No JVD. Trachea: No tracheal deviation. Cardiovascular:      Rate and Rhythm: Normal rate and regular rhythm. Heart sounds: No murmur. No friction rub. No gallop. Pulmonary:      Effort: No respiratory distress. Breath sounds: Normal breath sounds. No stridor. No wheezing or rales. Chest:      Chest wall: No tenderness. Abdominal:      General: Bowel sounds are normal. There is no distension. Palpations: Abdomen is soft. There is no mass. Tenderness: There is abdominal tenderness in the right lower quadrant. There is no guarding or rebound. Musculoskeletal: Normal range of motion. General: No tenderness. Lymphadenopathy:      Cervical: No cervical adenopathy. Skin:     General: Skin is warm and dry. Neurological:      Mental Status: He is alert and oriented to person, place, and time.       Coordination: Coordination normal.   Psychiatric:         Behavior: Behavior normal.          MDM  Number of Diagnoses or Management Options  Diagnosis management comments:    * routine laboratory data and UA   * CT abd/pelvis   * IVF, zofran, and toradol        Amount and/or Complexity of Data Reviewed  Clinical lab tests: ordered and reviewed  Tests in the radiology section of CPT®: ordered and reviewed  Discussion of test results with the performing providers: yes  Review and summarize past medical records: yes  Discuss the patient with other providers: yes    Risk of Complications, Morbidity, and/or Mortality  General comments:    - stable, ambulatory pt in NAD    Patient Progress  Patient progress: stable         Procedures        11:28 AM  Pt has been reevaluated. There are no new complaints, changes, or physical findings at this time. Medications have been reviewed w/ pt and/or family. Pt and/or family's questions have been answered. Pt and/or family expressed good understanding of the dx/tx/rx and is in agreement with plan of care. Pt instructed and agreed to f/u w/ PCP and GI and to return to ED upon further deterioration. Pt is ready for discharge. LABORATORY TESTS:  Recent Results (from the past 12 hour(s))   CBC WITH AUTOMATED DIFF    Collection Time: 04/22/20  9:26 AM   Result Value Ref Range    WBC 7.8 4.1 - 11.1 K/uL    RBC 5.10 4. 10 - 5.70 M/uL    HGB 16.0 12.1 - 17.0 g/dL    HCT 44.0 36.6 - 50.3 %    MCV 86.3 80.0 - 99.0 FL    MCH 31.4 26.0 - 34.0 PG    MCHC 36.4 30.0 - 36.5 g/dL    RDW 12.4 11.5 - 14.5 %    PLATELET 871 338 - 669 K/uL    MPV 10.2 8.9 - 12.9 FL    NRBC 0.0 0  WBC    ABSOLUTE NRBC 0.00 0.00 - 0.01 K/uL    NEUTROPHILS 66 32 - 75 %    LYMPHOCYTES 26 12 - 49 %    MONOCYTES 7 5 - 13 %    EOSINOPHILS 1 0 - 7 %    BASOPHILS 0 0 - 1 %    IMMATURE GRANULOCYTES 0 0.0 - 0.5 %    ABS. NEUTROPHILS 5.1 1.8 - 8.0 K/UL    ABS. LYMPHOCYTES 2.0 0.8 - 3.5 K/UL    ABS. MONOCYTES 0.5 0.0 - 1.0 K/UL    ABS. EOSINOPHILS 0.1 0.0 - 0.4 K/UL    ABS. BASOPHILS 0.0 0.0 - 0.1 K/UL    ABS. IMM.  GRANS. 0.0 0.00 - 0.04 K/UL    DF AUTOMATED     METABOLIC PANEL, COMPREHENSIVE    Collection Time: 04/22/20  9:26 AM   Result Value Ref Range    Sodium 139 136 - 145 mmol/L    Potassium 3.9 3.5 - 5.1 mmol/L    Chloride 108 97 - 108 mmol/L    CO2 23 21 - 32 mmol/L    Anion gap 8 5 - 15 mmol/L    Glucose 92 65 - 100 mg/dL    BUN 14 6 - 20 MG/DL    Creatinine 0.94 0.70 - 1.30 MG/DL    BUN/Creatinine ratio 15 12 - 20      GFR est AA >60 >60 ml/min/1.73m2    GFR est non-AA >60 >60 ml/min/1.73m2    Calcium 8.7 8.5 - 10.1 MG/DL    Bilirubin, total 1.7 (H) 0.2 - 1.0 MG/DL    ALT (SGPT) 71 12 - 78 U/L    AST (SGOT) 27 15 - 37 U/L    Alk. phosphatase 78 45 - 117 U/L    Protein, total 7.0 6.4 - 8.2 g/dL    Albumin 3.7 3.5 - 5.0 g/dL    Globulin 3.3 2.0 - 4.0 g/dL    A-G Ratio 1.1 1.1 - 2.2     SAMPLES BEING HELD    Collection Time: 04/22/20  9:26 AM   Result Value Ref Range    SAMPLES BEING HELD 1RD,1SST,1BL     COMMENT        Add-on orders for these samples will be processed based on acceptable specimen integrity and analyte stability, which may vary by analyte. LIPASE    Collection Time: 04/22/20  9:26 AM   Result Value Ref Range    Lipase 79 73 - 393 U/L   URINALYSIS W/MICROSCOPIC    Collection Time: 04/22/20  9:50 AM   Result Value Ref Range    Color YELLOW/STRAW      Appearance CLEAR CLEAR      Specific gravity 1.019 1.003 - 1.030      pH (UA) 6.0 5.0 - 8.0      Protein Negative NEG mg/dL    Glucose Negative NEG mg/dL    Ketone Negative NEG mg/dL    Bilirubin Negative NEG      Blood Negative NEG      Urobilinogen 0.2 0.2 - 1.0 EU/dL    Nitrites Negative NEG      Leukocyte Esterase Negative NEG      WBC 0-4 0 - 4 /hpf    RBC 0-5 0 - 5 /hpf    Epithelial cells FEW FEW /lpf    Bacteria Negative NEG /hpf    Hyaline cast 0-2 0 - 5 /lpf   URINE CULTURE HOLD SAMPLE    Collection Time: 04/22/20  9:50 AM   Result Value Ref Range    Urine culture hold        Urine on hold in Microbiology dept for 2 days.   If unpreserved urine is submitted, it cannot be used for addtional testing after 24 hours, recollection will be required. IMAGING RESULTS:  CT ABD PELV W CONT   Final Result   IMPRESSION:   No acute intraperitoneal process. Ct Abd Pelv W Cont    Result Date: 4/22/2020  EXAM: CT ABD PELV W CONT History: Right lower quadrant pain INDICATION: RLQ pain COMPARISON: 4/27/2017 CONTRAST: 100 mL of Isovue-370. TECHNIQUE: Following the uneventful intravenous administration of contrast, thin axial images were obtained through the abdomen and pelvis. Coronal and sagittal reconstructions were generated. Oral contrast was not administered. CT dose reduction was achieved through use of a standardized protocol tailored for this examination and automatic exposure control for dose modulation. FINDINGS: LOWER THORAX: No significant abnormality in the incidentally imaged lower chest. LIVER: Hepatic steatosis. BILIARY TREE: Gallbladder is within normal limits. CBD is not dilated. SPLEEN: within normal limits. PANCREAS: No mass or ductal dilatation. ADRENALS: Unremarkable. KIDNEYS: No mass, calculus, or hydronephrosis. STOMACH: Unremarkable. SMALL BOWEL: No dilatation or wall thickening. COLON: No dilatation or wall thickening. APPENDIX: Unremarkable. PERITONEUM: No ascites or pneumoperitoneum. RETROPERITONEUM: No lymphadenopathy or aortic aneurysm. REPRODUCTIVE ORGANS: URINARY BLADDER: No mass or calculus. BONES: No destructive bone lesion. ABDOMINAL WALL: No mass or hernia. ADDITIONAL COMMENTS: N/A     IMPRESSION: No acute intraperitoneal process. MEDICATIONS GIVEN:  Medications   iopamidoL (ISOVUE-370) 76 % injection 100 mL (has no administration in time range)   sodium chloride 0.9 % bolus infusion 1,000 mL (0 mL IntraVENous IV Completed 4/22/20 1122)   ondansetron (ZOFRAN) injection 4 mg (4 mg IntraVENous Given 4/22/20 0942)   ketorolac (TORADOL) injection 30 mg (30 mg IntraVENous Given 4/22/20 0942)       IMPRESSION:  1. Abdominal pain, generalized        PLAN:  1.    Discharge Medication List as of 4/22/2020 10:57 AM      START taking these medications    Details   ondansetron (Zofran ODT) 4 mg disintegrating tablet Take 1 Tab by mouth every eight (8) hours as needed for Nausea or Vomiting., Normal, Disp-20 Tab, R-0      dicyclomine (BentyL) 10 mg capsule Take 1 Cap by mouth four (4) times daily for 5 days. , Normal, Disp-20 Cap, R-0         CONTINUE these medications which have NOT CHANGED    Details   amitriptyline (ELAVIL) 100 mg tablet Take 100 mg by mouth nightly., Historical Med      pregabalin (LYRICA) 75 mg capsule Take 75 mg by mouth two (2) times a day., Historical Med      cholecalciferol, vitamin D3, (VITAMIN D3 PO) Take 1 Tab by mouth daily. , Historical Med      atorvastatin (LIPITOR) 40 mg tablet Take 1 Tab by mouth daily. , Normal, Disp-90 Tab, R-3      pantoprazole (PROTONIX) 20 mg tablet Take 20 mg by mouth daily. , Historical Med      dextroamphetamine-amphetamine (ADDERALL) 30 mg tablet Take 30 mg by mouth daily. , Historical Med      FEXOFENADINE HCL (ALLEGRA PO) Take 1 Tab by mouth daily as needed., Historical Med      diclofenac EC (VOLTAREN) 50 mg EC tablet Historical Med      TREXIMET  mg tablet TK 1 T PO D PRN, Historical Med, R-3, ALEXI           2. Follow-up Information     Follow up With Specialties Details Why Contact Info    Radhames Baptiste MD Family Practice In 2 days  [address] [place] [address] 33  [phone]      Cynthia Navas MD Gastroenterology In 2 days  [id number] [phone]      [facility] EMERGENCY DEPT Emergency Medicine  As needed, If symptoms worsen [facility]  [phone]        3.  Supportive care     Return to ED if worse     Aleksandr Almaguer NP  11:28 AM

## 2020-04-22 NOTE — ED TRIAGE NOTES
Pt reports right lower abdominal pain that has been going on x 2 weeks. Pt reports vomiting this morning and diarrhea the past few days. No fevers.

## 2020-04-22 NOTE — DISCHARGE INSTRUCTIONS
Patient Education        Abdominal Pain: Care Instructions  Your Care Instructions    Abdominal pain has many possible causes. Some aren't serious and get better on their own in a few days. Others need more testing and treatment. If your pain continues or gets worse, you need to be rechecked and may need more tests to find out what is wrong. You may need surgery to correct the problem. Don't ignore new symptoms, such as fever, nausea and vomiting, urination problems, pain that gets worse, and dizziness. These may be signs of a more serious problem. Your doctor may have recommended a follow-up visit in the next 8 to 12 hours. If you are not getting better, you may need more tests or treatment. The doctor has checked you carefully, but problems can develop later. If you notice any problems or new symptoms, get medical treatment right away. Follow-up care is a key part of your treatment and safety. Be sure to make and go to all appointments, and call your doctor if you are having problems. It's also a good idea to know your test results and keep a list of the medicines you take. How can you care for yourself at home? · Rest until you feel better. · To prevent dehydration, drink plenty of fluids, enough so that your urine is light yellow or clear like water. Choose water and other caffeine-free clear liquids until you feel better. If you have kidney, heart, or liver disease and have to limit fluids, talk with your doctor before you increase the amount of fluids you drink. · If your stomach is upset, eat mild foods, such as rice, dry toast or crackers, bananas, and applesauce. Try eating several small meals instead of two or three large ones. · Wait until 48 hours after all symptoms have gone away before you have spicy foods, alcohol, and drinks that contain caffeine. · Do not eat foods that are high in fat. · Avoid anti-inflammatory medicines such as aspirin, ibuprofen (Advil, Motrin), and naproxen (Aleve). These can cause stomach upset. Talk to your doctor if you take daily aspirin for another health problem. When should you call for help? Call 911 anytime you think you may need emergency care. For example, call if:    · You passed out (lost consciousness).     · You pass maroon or very bloody stools.     · You vomit blood or what looks like coffee grounds.     · You have new, severe belly pain.    Call your doctor now or seek immediate medical care if:    · Your pain gets worse, especially if it becomes focused in one area of your belly.     · You have a new or higher fever.     · Your stools are black and look like tar, or they have streaks of blood.     · You have unexpected vaginal bleeding.     · You have symptoms of a urinary tract infection. These may include:  ? Pain when you urinate. ? Urinating more often than usual.  ? Blood in your urine.     · You are dizzy or lightheaded, or you feel like you may faint.    Watch closely for changes in your health, and be sure to contact your doctor if:    · You are not getting better after 1 day (24 hours). Where can you learn more? Go to http://[facility]ctions[facility]ner.info/  Enter Y661 in the search box to learn more about \"Abdominal Pain: Care Instructions. \"  Current as of: June 26, 2019Content Version: 12.4  © 1427-5326 [employer]wise, Incorporated. Care instructions adapted under license by [employer] (which disclaims liability or warranty for this information). If you have questions about a medical condition or this instruction, always ask your healthcare professional. [place] [address] any warranty or liability for your use of this information. We hope that we have addressed all of your medical concerns. The examination and treatment you received in the Emergency Department were for an emergent problem and were not intended as complete care.  It is important that you follow up with your healthcare provider(s) for ongoing care. If your symptoms worsen or do not improve as expected, and you are unable to reach your usual health care provider(s), you should return to the Emergency Department. Today's healthcare is undergoing tremendous change, and patient satisfaction surveys are one of the many tools to assess the quality of medical care. You may receive a survey from the [employer] regarding your experience in the Emergency Department. I hope that your experience has been completely positive, particularly the medical care that I provided. As such, please participate in the survey; anything less than excellent does not meet my expectations or intentions. [facility] and [address] [place] [address] participate in nationally recognized quality of care measures. If your blood pressure is greater than 120/80, as reported below, we urge that you seek medical care to address the potential of high blood pressure, commonly known as hypertension. Hypertension can be hereditary or can be caused by certain medical conditions, pain, stress, or \"white coat syndrome. \"       Please make an appointment with your health care provider(s) for follow up of your Emergency Department visit. VITALS:   Patient Vitals for the past 8 hrs:   Temp Pulse Resp BP SpO2   04/22/20 0852 97.5 °F (36.4 °C) 94 15 167/84 100 %          Thank you for allowing us to provide you with medical care today. We realize that you have many choices for your emergency care needs. Please choose us in the future for any continued health care needs. Beverley Graham, [address] [place] [address].   Office: [phone]            Recent Results (from the past 24 hour(s))   CBC WITH AUTOMATED DIFF    Collection Time: 04/22/20  9:26 AM   Result Value Ref Range    WBC 7.8 4.1 - 11.1 K/uL    RBC 5.10 4. 10 - 5.70 M/uL    HGB 16.0 12.1 - 17.0 g/dL    HCT 44.0 36.6 - 50.3 %    MCV 86.3 80.0 - 99.0 FL    MCH 31.4 26.0 - 34.0 PG    MCHC 36.4 30.0 - 36.5 g/dL    RDW 12.4 11.5 - 14.5 %    PLATELET 412 290 - 116 K/uL    MPV 10.2 8.9 - 12.9 FL    NRBC 0.0 0  WBC    ABSOLUTE NRBC 0.00 0.00 - 0.01 K/uL    NEUTROPHILS 66 32 - 75 %    LYMPHOCYTES 26 12 - 49 %    MONOCYTES 7 5 - 13 %    EOSINOPHILS 1 0 - 7 %    BASOPHILS 0 0 - 1 %    IMMATURE GRANULOCYTES 0 0.0 - 0.5 %    ABS. NEUTROPHILS 5.1 1.8 - 8.0 K/UL    ABS. LYMPHOCYTES 2.0 0.8 - 3.5 K/UL    ABS. MONOCYTES 0.5 0.0 - 1.0 K/UL    ABS. EOSINOPHILS 0.1 0.0 - 0.4 K/UL    ABS. BASOPHILS 0.0 0.0 - 0.1 K/UL    ABS. IMM. GRANS. 0.0 0.00 - 0.04 K/UL    DF AUTOMATED     METABOLIC PANEL, COMPREHENSIVE    Collection Time: 04/22/20  9:26 AM   Result Value Ref Range    Sodium 139 136 - 145 mmol/L    Potassium 3.9 3.5 - 5.1 mmol/L    Chloride 108 97 - 108 mmol/L    CO2 23 21 - 32 mmol/L    Anion gap 8 5 - 15 mmol/L    Glucose 92 65 - 100 mg/dL    BUN 14 6 - 20 MG/DL    Creatinine 0.94 0.70 - 1.30 MG/DL    BUN/Creatinine ratio 15 12 - 20      GFR est AA >60 >60 ml/min/1.73m2    GFR est non-AA >60 >60 ml/min/1.73m2    Calcium 8.7 8.5 - 10.1 MG/DL    Bilirubin, total 1.7 (H) 0.2 - 1.0 MG/DL    ALT (SGPT) 71 12 - 78 U/L    AST (SGOT) 27 15 - 37 U/L    Alk. phosphatase 78 45 - 117 U/L    Protein, total 7.0 6.4 - 8.2 g/dL    Albumin 3.7 3.5 - 5.0 g/dL    Globulin 3.3 2.0 - 4.0 g/dL    A-G Ratio 1.1 1.1 - 2.2     SAMPLES BEING HELD    Collection Time: 04/22/20  9:26 AM   Result Value Ref Range    SAMPLES BEING HELD 1RD,1SST,1BL     COMMENT        Add-on orders for these samples will be processed based on acceptable specimen integrity and analyte stability, which may vary by analyte.    LIPASE    Collection Time: 04/22/20  9:26 AM   Result Value Ref Range    Lipase 79 73 - 393 U/L   URINALYSIS W/MICROSCOPIC    Collection Time: 04/22/20  9:50 AM   Result Value Ref Range    Color YELLOW/STRAW      Appearance CLEAR CLEAR      Specific gravity 1.019 1.003 - 1.030 pH (UA) 6.0 5.0 - 8.0      Protein Negative NEG mg/dL    Glucose Negative NEG mg/dL    Ketone Negative NEG mg/dL    Bilirubin Negative NEG      Blood Negative NEG      Urobilinogen 0.2 0.2 - 1.0 EU/dL    Nitrites Negative NEG      Leukocyte Esterase Negative NEG      WBC 0-4 0 - 4 /hpf    RBC 0-5 0 - 5 /hpf    Epithelial cells FEW FEW /lpf    Bacteria Negative NEG /hpf    Hyaline cast 0-2 0 - 5 /lpf   URINE CULTURE HOLD SAMPLE    Collection Time: 04/22/20  9:50 AM   Result Value Ref Range    Urine culture hold        Urine on hold in Microbiology dept for 2 days. If unpreserved urine is submitted, it cannot be used for addtional testing after 24 hours, recollection will be required. Ct Abd Pelv W Cont    Result Date: 4/22/2020  EXAM: CT ABD PELV W CONT History: Right lower quadrant pain INDICATION: RLQ pain COMPARISON: 4/27/2017 CONTRAST: 100 mL of Isovue-370. TECHNIQUE: Following the uneventful intravenous administration of contrast, thin axial images were obtained through the abdomen and pelvis. Coronal and sagittal reconstructions were generated. Oral contrast was not administered. CT dose reduction was achieved through use of a standardized protocol tailored for this examination and automatic exposure control for dose modulation. FINDINGS: LOWER THORAX: No significant abnormality in the incidentally imaged lower chest. LIVER: Hepatic steatosis. BILIARY TREE: Gallbladder is within normal limits. CBD is not dilated. SPLEEN: within normal limits. PANCREAS: No mass or ductal dilatation. ADRENALS: Unremarkable. KIDNEYS: No mass, calculus, or hydronephrosis. STOMACH: Unremarkable. SMALL BOWEL: No dilatation or wall thickening. COLON: No dilatation or wall thickening. APPENDIX: Unremarkable. PERITONEUM: No ascites or pneumoperitoneum. RETROPERITONEUM: No lymphadenopathy or aortic aneurysm. REPRODUCTIVE ORGANS: URINARY BLADDER: No mass or calculus. BONES: No destructive bone lesion.  ABDOMINAL WALL: No mass or hernia. ADDITIONAL COMMENTS: N/A     IMPRESSION: No acute intraperitoneal process.

## 2020-04-23 ENCOUNTER — PATIENT OUTREACH (OUTPATIENT)
Dept: FAMILY MEDICINE CLINIC | Age: 20
End: 2020-04-23

## 2020-04-24 NOTE — PROGRESS NOTES
Patient unable to be contacted regarding recent discharge and COVID-19 risk, will close episode at this time.

## 2020-06-02 ENCOUNTER — TELEPHONE (OUTPATIENT)
Dept: FAMILY MEDICINE CLINIC | Age: 20
End: 2020-06-02

## 2020-06-02 DIAGNOSIS — K90.41 GLUTEN INTOLERANCE: ICD-10-CM

## 2020-06-02 RX ORDER — EPINEPHRINE 0.3 MG/.3ML
0.3 INJECTION SUBCUTANEOUS
Qty: 1 SYRINGE | Refills: 0 | Status: SHIPPED | OUTPATIENT
Start: 2020-06-02 | End: 2020-06-02

## 2020-06-02 NOTE — TELEPHONE ENCOUNTER
----- Message from Jovan Brown sent at 2020  4:44 PM EDT -----  Regarding: Dr. Angel Romo / Alex aWn Message/Vendor Calls    To: [facility]  Subject: Dr. Angel Romo / Telephone  Patient's first and last name: Segundo Lopez  : 2000  ID numbers: [id number]    Caller's first and last name: N/A  Reason for call: Pt calling to check on status of his request for new EpiPen.    Callback required yes/no and why: Yes  Best contact number(s): [phone] or [phone]  Details to clarify the request: N/A

## 2020-11-02 DIAGNOSIS — I10 HYPERTENSION, ESSENTIAL: Primary | ICD-10-CM

## 2020-11-02 DIAGNOSIS — E66.9 OBESITY, CLASS II, BMI 35-39.9: ICD-10-CM

## 2020-11-02 DIAGNOSIS — E78.00 HYPERCHOLESTEROLEMIA: ICD-10-CM

## 2020-11-02 DIAGNOSIS — K90.41 GLUTEN INTOLERANCE: ICD-10-CM

## 2020-11-06 PROBLEM — E78.00 HYPERCHOLESTEROLEMIA: Status: ACTIVE | Noted: 2020-11-06

## 2020-11-06 PROBLEM — E66.9 OBESITY, CLASS II, BMI 35-39.9: Status: ACTIVE | Noted: 2020-11-06

## 2020-11-06 RX ORDER — ATORVASTATIN CALCIUM 40 MG/1
40 TABLET, FILM COATED ORAL DAILY
Qty: 90 TAB | Refills: 0 | Status: SHIPPED | OUTPATIENT
Start: 2020-11-06 | End: 2020-11-13

## 2020-11-10 ENCOUNTER — LAB ONLY (OUTPATIENT)
Dept: FAMILY MEDICINE CLINIC | Age: 20
End: 2020-11-10

## 2020-11-10 DIAGNOSIS — K90.41 GLUTEN INTOLERANCE: ICD-10-CM

## 2020-11-10 DIAGNOSIS — E66.9 OBESITY, CLASS II, BMI 35-39.9: ICD-10-CM

## 2020-11-10 DIAGNOSIS — I10 HYPERTENSION, ESSENTIAL: ICD-10-CM

## 2020-11-10 DIAGNOSIS — E78.00 HYPERCHOLESTEROLEMIA: ICD-10-CM

## 2020-11-10 LAB
ALBUMIN SERPL-MCNC: 4.4 G/DL (ref 3.5–5)
ALBUMIN/GLOB SERPL: 1.5 {RATIO} (ref 1.1–2.2)
ALP SERPL-CCNC: 103 U/L (ref 45–117)
ALT SERPL-CCNC: 84 U/L (ref 12–78)
ANION GAP SERPL CALC-SCNC: 8 MMOL/L (ref 5–15)
APPEARANCE UR: CLEAR
AST SERPL-CCNC: 32 U/L (ref 15–37)
BILIRUB SERPL-MCNC: 1.4 MG/DL (ref 0.2–1)
BILIRUB UR QL: NEGATIVE
BUN SERPL-MCNC: 12 MG/DL (ref 6–20)
BUN/CREAT SERPL: 14 (ref 12–20)
CALCIUM SERPL-MCNC: 9.2 MG/DL (ref 8.5–10.1)
CHLORIDE SERPL-SCNC: 104 MMOL/L (ref 97–108)
CHOLEST SERPL-MCNC: 202 MG/DL
CO2 SERPL-SCNC: 27 MMOL/L (ref 21–32)
COLOR UR: NORMAL
COMMENT, HOLDF: NORMAL
CREAT SERPL-MCNC: 0.84 MG/DL (ref 0.7–1.3)
EST. AVERAGE GLUCOSE BLD GHB EST-MCNC: 105 MG/DL
FOLATE SERPL-MCNC: 2.2 NG/ML (ref 5–21)
GLOBULIN SER CALC-MCNC: 2.9 G/DL (ref 2–4)
GLUCOSE SERPL-MCNC: 83 MG/DL (ref 65–100)
GLUCOSE UR STRIP.AUTO-MCNC: NEGATIVE MG/DL
HBA1C MFR BLD: 5.3 % (ref 4–5.6)
HCT VFR BLD AUTO: 44.7 % (ref 36.6–50.3)
HDLC SERPL-MCNC: 31 MG/DL
HDLC SERPL: 6.5 {RATIO} (ref 0–5)
HGB BLD-MCNC: 16 G/DL (ref 12.1–17)
HGB UR QL STRIP: NEGATIVE
KETONES UR QL STRIP.AUTO: NEGATIVE MG/DL
LDLC SERPL CALC-MCNC: 139 MG/DL (ref 0–100)
LEUKOCYTE ESTERASE UR QL STRIP.AUTO: NEGATIVE
LIPID PROFILE,FLP: ABNORMAL
NITRITE UR QL STRIP.AUTO: NEGATIVE
PH UR STRIP: 6.5 [PH] (ref 5–8)
POTASSIUM SERPL-SCNC: 4.2 MMOL/L (ref 3.5–5.1)
PROT SERPL-MCNC: 7.3 G/DL (ref 6.4–8.2)
PROT UR STRIP-MCNC: NEGATIVE MG/DL
SAMPLES BEING HELD,HOLD: NORMAL
SODIUM SERPL-SCNC: 139 MMOL/L (ref 136–145)
SP GR UR REFRACTOMETRY: 1.01 (ref 1–1.03)
TRIGL SERPL-MCNC: 160 MG/DL (ref ?–150)
UROBILINOGEN UR QL STRIP.AUTO: 0.2 EU/DL (ref 0.2–1)
VIT B12 SERPL-MCNC: 332 PG/ML (ref 193–986)
VLDLC SERPL CALC-MCNC: 32 MG/DL

## 2020-11-11 DIAGNOSIS — R74.01 ELEVATED LIVER TRANSAMINASE LEVEL: ICD-10-CM

## 2020-11-11 DIAGNOSIS — E53.8 FOLIC ACID DEFICIENCY: ICD-10-CM

## 2020-11-11 DIAGNOSIS — K76.0 FATTY LIVER: Primary | ICD-10-CM

## 2020-11-11 PROBLEM — K57.90 DIVERTICULOSIS: Status: ACTIVE | Noted: 2020-11-11

## 2020-11-12 NOTE — PROGRESS NOTES
Significant folic acid deficiency and low normal B12 likely multifactorial, including inadequate intake (given gluten-free diet), malabsorption with IBS, use of PPIs, increased use (hx of +FOBT) and NAFLD. Patients folate deficiency may also be contributing to his overall fatigue, lethargy, muscle weakness/pain and GI symptoms. There may be a correlation between folic BDFI/R32 and the severity of liver disease. Furthermore, NAFLD in patients with psoriasis have more severe skin disease and are at higher risk of severe liver fibrosis. Recommend M39 and folic acid replacement. Patient should avoid all alcohol and work towards a FOSTER/NAFLD diet. I also recommend we have him evaluated by the hepatologist for further recommendations.

## 2020-12-29 ENCOUNTER — OFFICE VISIT (OUTPATIENT)
Dept: FAMILY MEDICINE CLINIC | Age: 20
End: 2020-12-29
Payer: COMMERCIAL

## 2020-12-29 VITALS
BODY MASS INDEX: 36.8 KG/M2 | HEIGHT: 66 IN | OXYGEN SATURATION: 98 % | WEIGHT: 229 LBS | TEMPERATURE: 97.6 F | DIASTOLIC BLOOD PRESSURE: 88 MMHG | HEART RATE: 89 BPM | RESPIRATION RATE: 20 BRPM | SYSTOLIC BLOOD PRESSURE: 138 MMHG

## 2020-12-29 DIAGNOSIS — R30.0 DYSURIA: Primary | ICD-10-CM

## 2020-12-29 DIAGNOSIS — Z87.442 HISTORY OF KIDNEY STONES: ICD-10-CM

## 2020-12-29 LAB
BILIRUB UR QL STRIP: NEGATIVE
GLUCOSE UR-MCNC: NEGATIVE MG/DL
KETONES P FAST UR STRIP-MCNC: NEGATIVE MG/DL
PH UR STRIP: 5.5 [PH] (ref 4.6–8)
PROT UR QL STRIP: NEGATIVE
SP GR UR STRIP: 1.03 (ref 1–1.03)
UA UROBILINOGEN AMB POC: NORMAL (ref 0.2–1)
URINALYSIS CLARITY POC: CLEAR
URINALYSIS COLOR POC: YELLOW
URINE BLOOD POC: NORMAL
URINE LEUKOCYTES POC: NEGATIVE
URINE NITRITES POC: NEGATIVE

## 2020-12-29 PROCEDURE — 81003 URINALYSIS AUTO W/O SCOPE: CPT | Performed by: STUDENT IN AN ORGANIZED HEALTH CARE EDUCATION/TRAINING PROGRAM

## 2020-12-29 PROCEDURE — 99213 OFFICE O/P EST LOW 20 MIN: CPT | Performed by: STUDENT IN AN ORGANIZED HEALTH CARE EDUCATION/TRAINING PROGRAM

## 2020-12-29 RX ORDER — ONDANSETRON 4 MG/1
4 TABLET, ORALLY DISINTEGRATING ORAL
Qty: 6 TAB | Refills: 0 | Status: SHIPPED | OUTPATIENT
Start: 2020-12-29 | End: 2020-12-31

## 2020-12-29 RX ORDER — PHENAZOPYRIDINE HYDROCHLORIDE 95 MG/1
190 TABLET ORAL
Qty: 6 TAB | Refills: 0 | Status: SHIPPED | OUTPATIENT
Start: 2020-12-29 | End: 2020-12-31

## 2020-12-29 RX ORDER — NAPROXEN 500 MG/1
500 TABLET ORAL 2 TIMES DAILY WITH MEALS
Qty: 10 TAB | Refills: 0 | Status: SHIPPED | OUTPATIENT
Start: 2020-12-29 | End: 2021-01-01

## 2020-12-29 NOTE — PATIENT INSTRUCTIONS
Learning About Diet for Kidney Stone Prevention What are kidney stones? Kidney stones are small \"perla\" that form in your kidneys. They're made of salts and minerals in the urine. Stones may not cause a problem as long as they stay in the kidneys. But they can cause sudden, severe pain. Pain is most likely when the stones travel through the ureters (the tubes that carry urine from the kidneys to the bladder). Kidney stones can cause bloody urine. Kidney stones often run in families. You are more likely to get them if you don't drink enough fluids, mainly water. Certain foods and drinks and some dietary supplements may also increase your risk for kidney stones if you consume too much of them. What can you do to prevent kidney stones? Changing what you eat may not prevent all types of kidney stones. But for people who have a history of certain kinds of kidney stones, some changes in diet may help. A dietitian can help you set up a meal plan that includes healthy, low-oxalate choices. Here are some general guidelines to get you started. Plan your meals and snacks around foods that are low in oxalate. These foods include: · Corn, kale, parsnips, and squash,. · Beef, chicken, pork, turkey, and fish. · Milk, butter, cheese, and yogurt. You can eat certain foods that are medium-high in oxalate, but eat them only once in a while. These foods include: · Bread. · Brown rice. · English muffins. · Figs. · Popcorn. · String beans. · Tomatoes. Limit very high-oxalate foods, including: · Black tea. · Coffee. · Chocolate. · Dark green vegetables. · Nuts. Here are some other things you can do to help prevent kidney stones. · Drink plenty of fluids. If you have kidney, heart, or liver disease and have to limit fluids, talk with your doctor before you increase the amount of fluids you drink. · Do not take more than the recommended daily dose of vitamins C and D. 
· Limit the salt in your diet. · Eat a balanced diet that is not too high in protein. Follow-up care is a key part of your treatment and safety. Be sure to make and go to all appointments, and call your doctor if you are having problems. It's also a good idea to know your test results and keep a list of the medicines you take. Where can you learn more? Go to http://www.gray.com/ Enter C138 in the search box to learn more about \"Learning About Diet for Kidney Stone Prevention. \" Current as of: August 22, 2019               Content Version: 12.6 © 7142-1345 [employer]. Care instructions adapted under license by [employer] (which disclaims liability or warranty for this information). If you have questions about a medical condition or this instruction, always ask your healthcare professional. [address] any warranty or liability for your use of this information.

## 2020-12-29 NOTE — PROGRESS NOTES
Chief Complaint   Patient presents with    Flank Pain     Patient presents with history left flank pain and indicates he passed a stone. Complains of pain, burning and clear discharge when urinating. 1. Have you been to the ER, urgent care clinic since your last visit? Hospitalized since your last visit? No    2. Have you seen or consulted any other health care providers outside of the [address] [place] [address] since your last visit? Include any pap smears or colon screening.  No    Visit Vitals  /88 (BP 1 Location: Left arm, BP Patient Position: Sitting)   Pulse 89   Temp 97.6 °F (36.4 °C) (Temporal)   Resp 20   Ht 5' 6\" (1.676 m)   Wt 229 lb (103.9 kg)   SpO2 98%   BMI 36.96 kg/m²

## 2020-12-29 NOTE — PROGRESS NOTES
CC: follow up after passing kidney stone    Subjective   Jose Juan benitez an 23 y.o. male presents for evaluation after passing kidney stones on 12/27 night. Today patient refers occasional bright red blood spots in urine, dysuria and  painful clear-light yellowish penile discharge. Patient also reports left sided flank pain that radiates to left side groin and pain at the tip of the penis. Pt refers pain right now he is taking tylenol with poor improvement of pain. Patient has a PMH of kidney stones and UTIs. Review of Systems   Review of Systems - General ROS: negative for - chills, fatigue, fever or malaise  Respiratory ROS: no cough, shortness of breath, or wheezing  Cardiovascular ROS: no chest pain or dyspnea on exertion  Gastrointestinal ROS: + nausea. no vomiting, abdominal pain, change in bowel habits, or black or bloody stools  Genito-Urinary ROS: positive for - dysuria, hematuria, pelvic pain, scrotal mass/pain and urinary frequency/urgency  Musculoskeletal ROS: negative for - joint pain, muscle pain or muscular weakness. Neurological ROS: negative for - confusion, dizziness or headaches  Dermatological ROS: negative for - rash or skin lesion changes    Allergies   Allergies   Allergen Reactions    Gluten Anaphylaxis    Sulfamethoprim Ds Anaphylaxis, Palpitations, Shortness of Breath and Swelling    Topamax [Topiramate] Anaphylaxis    Doxycycline Nausea and Vomiting and Swelling    Zomig [Zolmitriptan] Shortness of Breath       Medications  Current Outpatient Medications   Medication Sig    phenazopyridine (PYRIDIUM) 95 mg tab Take 2 Tabs by mouth three (3) times daily as needed for Pain for up to 2 days. Two tablets (190 mg) 3 times daily administered with or after meals for up to 2 days.  naproxen (NAPROSYN) 500 mg tablet Take 1 Tab by mouth two (2) times daily (with meals) for 5 days.     ondansetron (ZOFRAN ODT) 4 mg disintegrating tablet Take 1 Tab by mouth every eight (8) hours as needed for Nausea or Vomiting for up to 2 days.  atorvastatin (LIPITOR) 40 mg tablet TAKE 1 TABLET BY MOUTH EVERY DAY    amitriptyline (ELAVIL) 100 mg tablet Take 100 mg by mouth nightly.  pregabalin (LYRICA) 75 mg capsule Take 75 mg by mouth two (2) times a day.  cholecalciferol, vitamin D3, (VITAMIN D3 PO) Take 1 Tab by mouth daily.  pantoprazole (PROTONIX) 20 mg tablet Take 20 mg by mouth daily.  dextroamphetamine-amphetamine (ADDERALL) 30 mg tablet Take 30 mg by mouth daily.  TREXIMET  mg tablet TK 1 T PO D PRN    FEXOFENADINE HCL (ALLEGRA PO) Take 1 Tab by mouth daily as needed.  ondansetron (Zofran ODT) 4 mg disintegrating tablet Take 1 Tab by mouth every eight (8) hours as needed for Nausea or Vomiting.  diclofenac EC (VOLTAREN) 50 mg EC tablet      No current facility-administered medications for this visit. Medical History  Past Medical History:   Diagnosis Date    ADHD (attention deficit hyperactivity disorder)     Calculus of kidney     2018    Fibromyalgia     GERD (gastroesophageal reflux disease)     Hypercholesteremia     Hypertension, essential 2/21/2018    IBS (irritable bowel syndrome)     Migraine     Psoriasis        Immunizations   Immunization History   Administered Date(s) Administered    Influenza Vaccine 10/11/2020          Past Surgical History:   Procedure Laterality Date    COLONOSCOPY N/A 5/9/2017    COLONOSCOPY performed by Cintia Rodgers MD at [facility]      wisdom teeth     No family history on file.   Social History     Tobacco Use    Smoking status: Never Smoker    Smokeless tobacco: Never Used   Substance Use Topics    Alcohol use: No       Objective   Vital Signs  Visit Vitals  /88 (BP 1 Location: Left arm, BP Patient Position: Sitting)   Pulse 89   Temp 97.6 °F (36.4 °C) (Temporal)   Resp 20   Ht 5' 6\" (1.676 m)   Wt 229 lb (103.9 kg)   SpO2 98%   BMI 36.96 kg/m²       Physical Exam  General appearance - Alert, NAD. Head: Atraumatic. Normocephalic. No lymphadenopathy  Eyes: EOMI. Sclera white. Ears: Hearing grossly normal.  Respiratory - LCTAB. No wheeze/rale/rhonchi  Heart - Normal rate, regular rhythm. No m/r/r  Abdomen - Soft, non tender. Non distended. Neurological - No focal deficits. Speech normal.   Musculoskeletal - Normal ROM, Gait normal.    BACK: LLQ tenderness that radiates to scrotum   Extremities - No LE edema. Distal pulses intact  Skin - normal coloration and normal turgor. No cyanosis, no rash. : tenderness on testicles and on urethral meatus. Assessment   Janusz Vargas is a 23 y.o. who presents for follow up after passing kidney stone. Plan     Renal calculus: hx of passing stone on 12/27. Patient refers dysuria, left sided flank and groin pain. He also reports pain at ureteral meatus. No fever or chills. Patient with previous hx of UTIs and renal calculus. He follows with Dr. Koffi Andrade (urology) and has a schedule f/u visit in one week. -UA  -Continue pelvic physical therapy  -F/U with urology  -Start Naproxen 500 mg BID for 5 days.  -Start Pyridum for symtomatic relief.  -Increase fluid intake. -Zofran for nausea as needed    I have discussed the aforementioned diagnoses and plan with the patient in detail. I have provided information in person and/or in AVS. All questions answered prior to discharge.       I discussed this patient with Dr. Sloan Hagen  (Attending Physician)   Signed By:  Kana Mederos MD    Family Medicine Resident

## 2020-12-30 PROBLEM — R30.0 DYSURIA: Status: ACTIVE | Noted: 2020-12-30

## 2020-12-30 PROBLEM — Z87.442 HISTORY OF KIDNEY STONES: Status: ACTIVE | Noted: 2020-12-30

## 2020-12-31 DIAGNOSIS — Z87.442 HISTORY OF KIDNEY STONES: ICD-10-CM

## 2021-01-01 RX ORDER — NAPROXEN 500 MG/1
TABLET ORAL
Qty: 10 TAB | Refills: 0 | Status: SHIPPED | OUTPATIENT
Start: 2021-01-01 | End: 2021-01-11

## 2021-01-10 DIAGNOSIS — Z87.442 HISTORY OF KIDNEY STONES: ICD-10-CM

## 2021-01-11 RX ORDER — NAPROXEN 500 MG/1
TABLET ORAL
Qty: 10 TAB | Refills: 0 | Status: SHIPPED | OUTPATIENT
Start: 2021-01-11 | End: 2021-01-14

## 2021-01-13 DIAGNOSIS — Z87.442 HISTORY OF KIDNEY STONES: ICD-10-CM

## 2021-01-14 RX ORDER — NAPROXEN 500 MG/1
TABLET ORAL
Qty: 10 TAB | Refills: 0 | Status: SHIPPED | OUTPATIENT
Start: 2021-01-14 | End: 2021-07-21 | Stop reason: ALTCHOICE

## 2021-02-19 ENCOUNTER — VIRTUAL VISIT (OUTPATIENT)
Dept: FAMILY MEDICINE CLINIC | Age: 21
End: 2021-02-19
Payer: COMMERCIAL

## 2021-02-19 ENCOUNTER — OFFICE VISIT (OUTPATIENT)
Dept: HEMATOLOGY | Age: 21
End: 2021-02-19

## 2021-02-19 VITALS
HEART RATE: 102 BPM | BODY MASS INDEX: 36 KG/M2 | SYSTOLIC BLOOD PRESSURE: 155 MMHG | OXYGEN SATURATION: 97 % | RESPIRATION RATE: 16 BRPM | WEIGHT: 224 LBS | TEMPERATURE: 97 F | HEIGHT: 66 IN | DIASTOLIC BLOOD PRESSURE: 96 MMHG

## 2021-02-19 DIAGNOSIS — R68.89 TEMPERATURE INTOLERANCE: Primary | ICD-10-CM

## 2021-02-19 DIAGNOSIS — K58.9 IRRITABLE BOWEL SYNDROME, UNSPECIFIED TYPE: ICD-10-CM

## 2021-02-19 DIAGNOSIS — K76.0 NAFLD (NONALCOHOLIC FATTY LIVER DISEASE): Primary | ICD-10-CM

## 2021-02-19 DIAGNOSIS — Z11.59 NEED FOR HEPATITIS C SCREENING TEST: ICD-10-CM

## 2021-02-19 DIAGNOSIS — K75.81 NASH (NONALCOHOLIC STEATOHEPATITIS): ICD-10-CM

## 2021-02-19 DIAGNOSIS — Z87.442 HISTORY OF KIDNEY STONES: ICD-10-CM

## 2021-02-19 DIAGNOSIS — E53.8 FOLATE DEFICIENCY: ICD-10-CM

## 2021-02-19 PROCEDURE — 99214 OFFICE O/P EST MOD 30 MIN: CPT | Performed by: FAMILY MEDICINE

## 2021-02-19 PROCEDURE — 99203 OFFICE O/P NEW LOW 30 MIN: CPT | Performed by: INTERNAL MEDICINE

## 2021-02-19 NOTE — PROGRESS NOTES
Identified pt with two pt identifiers. Reviewed record in preparation for visit and have obtained necessary documentation. All patient medications has been reviewed. No chief complaint on file.     Additional information about chief complaint:    Visit Vitals  BP (!) 155/96 (BP 1 Location: Left upper arm, BP Patient Position: Sitting, BP Cuff Size: Large adult long)   Pulse (!) 102   Temp 97 °F (36.1 °C) (Temporal)   Resp 16   Ht 5' 6\" (1.676 m)   Wt 224 lb (101.6 kg)   SpO2 97%   BMI 36.15 kg/m²       Health Maintenance Due   Topic    Hepatitis C Screening     COVID-19 Vaccine (1 of 2)    DTaP/Tdap/Td series (1 - Tdap)         bdg

## 2021-02-19 NOTE — Clinical Note
2/27/2021 Patient: Rudy Call YOB: 2000 Date of Visit: 2/19/2021 Rosmery Adorno MD 
[address] [id number] [address] [employer] Dear Rosmery Adorno MD, Thank you for referring Mr. Grant Call to [facility] Alexa Watts for evaluation. My notes for this consultation are attached. If you have questions, please do not hesitate to call me. I look forward to following your patient along with you. Sincerely, Sanjana Espinoza MD

## 2021-02-19 NOTE — PROGRESS NOTES
Nafisa Campuzano  24 y.o. male  2000  [address] [facility]  [id number]   [address]:    Telemedicine Progress Note  Rasheeda Rodriguez MD       Encounter Date and Time: February 19, 2021 at 8:06 AM    Consent: Nafisa Campuzano, who was seen by synchronous (real-time) audio-video technology, and/or his healthcare decision maker, is aware that this patient-initiated, Telehealth encounter on 2/19/2021 is a billable service, with coverage as determined by his insurance carrier. He is aware that he may receive a bill and has provided verbal consent to proceed: Yes. Chief Complaint   Patient presents with    Fatigue    Fatty Liver     History of Present Illness   Nafisa Campuzano is a 24 y.o. male was evaluated by synchronous (real-time) audio-video technology from home, through a secure [employer] patient portal.    1. Temperature intolerance   He reports feeling cold all the time. States that his \"normal body temperature is 95. F\". Has dry skin with underlying psoriasis. No constipation   No fx of thyroid dysfunction    2. Chronic fatigue   Was diagnosed with chronic fatigue syndrome. Has fibromyalgia. On the previous lab work was found to have low folic acid and borderline vit B12, He started to take PO supplements 3 months ago. 3. FOSTER  Was diagnosed with fatty liver. Referred to hepatologist, has appointment with Dr. Angela Lees later today  Wants to see nutritionist      Social  -Quit tobacco in September, 2020, vaping nowand planning to quit   -No illicit drug use   -ETOH use 1-2 times a week     Review of Systems   Review of Systems   Constitutional: Negative for chills and fever. +Cold intolerance    Respiratory: Negative for cough and shortness of breath. Cardiovascular: Negative for chest pain and palpitations. Gastrointestinal: Negative for constipation. Neurological: Negative for dizziness and headaches.    Endo/Heme/Allergies: Does not bruise/bleed easily. Vitals/Objective:     General: alert, cooperative, no distress   Mental  status: mental status: alert, oriented to person, place, and time, normal mood, behavior, speech, dress, motor activity, and thought processes   Resp: resp: normal effort and no respiratory distress   Neuro: neuro: no gross deficits   Skin: skin: no discoloration or lesions of concern on visible areas   Due to this being a TeleHealth evaluation, many elements of the physical examination are unable to be assessed. Assessment and Plan:   Time-based coding, delete if not needed: I spent at least 30 minutes with this established patient, and >50% of the time was spent counseling and/or coordinating care regarding his diagnosis of FOSTER, symptoms of fatigue and cold intoleraance and discussed in length the diet. ICD-10-CM ICD-9-CM    1. Temperature intolerance  R68.89 780.99 TSH 3RD GENERATION      T3, FREE      T4, FREE   2. Need for hepatitis C screening test  Z11.59 V73.89 VITAMIN B12 & FOLATE      HEPATITIS C AB   3. Irritable bowel syndrome, unspecified type  K58.9 564.1    4. History of kidney stones  Z87.442 V13.01 REFERRAL TO NUTRITION   5. FOSTER (nonalcoholic steatohepatitis)  K75.81 571.8 REFERRAL TO NUTRITION   6. Folate deficiency  E53.8 266.2      1. Cold intolerance/ chronic fatigue Given the chronic fatigue and cold intolerance will check thyroid function. Will recheck folate and VitB12 levels, if no improvement with PO therapy will consider injectable form. 2. FOSTER. Discussed the diet and length. The patient wants to see the nutritionist to discuss the appropriate diet for him since her has FOSTER and hx of recurrent nephrolithiasis. Will screen for Hep C as well. Follow up with nutritionist as scheduled for later today.          Time spent in direct conversation with the patient to include medical condition(s) discussed, assessment and treatment plan:       We discussed the expected course, resolution and complications of the diagnosis(es) in detail. Medication risks, benefits, costs, interactions, and alternatives were discussed as indicated. I advised him to contact the office if his condition worsens, changes or fails to improve as anticipated. He expressed understanding with the diagnosis(es) and plan. Patient understands that this encounter was a temporary measure, and the importance of further follow up and examination was emphasized. Patient verbalized understanding. Patient informed to follow up: Will check the LABS first, further management based on results. Electronically Signed: Maurice Perez MD    CPT Codes 73041-54609 for Established Patients may apply to this Telehealth Visit. POS code: 18. Modifier GT    Chico Grover is a 24 y.o. male who was evaluated by an audio-video encounter for concerns as above. Patient identification was verified prior to start of the visit. A caregiver was present when appropriate. Due to this being a TeleHealth encounter (During [facility]- public health emergency), evaluation of the following organ systems was limited: Vitals/Constitutional/EENT/Resp/CV/GI//MS/Neuro/Skin/Heme-Lymph-Imm. Pursuant to the emergency declaration under the [facility], 1135 waiver authority and the [employer] and [employer] Act, this Virtual Visit was conducted, with patient's (and/or legal guardian's) consent, to reduce the patient's risk of exposure to COVID-19 and provide necessary medical care. Services were provided through a synchronous discussion virtually to substitute for in-person clinic visit. I was at home. The patient was at home. History   Patients past medical, surgical and family histories were reviewed and updated.       Past Medical History:   Diagnosis Date    ADHD (attention deficit hyperactivity disorder)     Calculus of kidney     2018    Fibromyalgia  GERD (gastroesophageal reflux disease)     Hypercholesteremia     Hypertension, essential 2/21/2018    IBS (irritable bowel syndrome)     Migraine     Psoriasis      Past Surgical History:   Procedure Laterality Date    COLONOSCOPY N/A 5/9/2017    COLONOSCOPY performed by Ervin Gutierrez MD at [address] [facility]      wisdom teeth     No family history on file. Social History     Tobacco Use    Smoking status: Never Smoker    Smokeless tobacco: Never Used   Substance Use Topics    Alcohol use: No    Drug use: No     Patient Active Problem List   Diagnosis Code    Body aches R52    Mild single current episode of major depressive disorder (HCC) F32.0    Hypertension, essential I10    Fibromyalgia M79.7    IBS (irritable bowel syndrome) K58.9    Gluten intolerance K90.41    Hypercholesterolemia E78.00    Obesity, Class II, BMI 55-70.7 W89.3    Folic acid deficiency Q63.7    Fatty liver K76.0    Diverticulosis K57.90    Dysuria R30.0    History of kidney stones Z87.442          Current Medications/Allergies   Medications and Allergies reviewed:    Current Outpatient Medications   Medication Sig Dispense Refill    naproxen (NAPROSYN) 500 mg tablet TAKE 1 TABLET BY MOUTH TWICE DAILY WITH MEALS FOR 5 DAYS 10 Tab 0    atorvastatin (LIPITOR) 40 mg tablet TAKE 1 TABLET BY MOUTH EVERY DAY 90 Tab 1    amitriptyline (ELAVIL) 100 mg tablet Take 100 mg by mouth nightly.  ondansetron (Zofran ODT) 4 mg disintegrating tablet Take 1 Tab by mouth every eight (8) hours as needed for Nausea or Vomiting. 20 Tab 0    diclofenac EC (VOLTAREN) 50 mg EC tablet       pregabalin (LYRICA) 75 mg capsule Take 75 mg by mouth two (2) times a day.  cholecalciferol, vitamin D3, (VITAMIN D3 PO) Take 1 Tab by mouth daily.  pantoprazole (PROTONIX) 20 mg tablet Take 20 mg by mouth daily.  dextroamphetamine-amphetamine (ADDERALL) 30 mg tablet Take 30 mg by mouth daily.       TREXIMET  mg tablet TK 1 T PO D PRN  3    FEXOFENADINE HCL (ALLEGRA PO) Take 1 Tab by mouth daily as needed.        Allergies   Allergen Reactions    Gluten Anaphylaxis    Sulfamethoprim Ds Anaphylaxis, Palpitations, Shortness of Breath and Swelling    Topamax [Topiramate] Anaphylaxis    Doxycycline Nausea and Vomiting and Swelling    Zomig [Zolmitriptan] Shortness of Breath

## 2021-02-19 NOTE — PROGRESS NOTES
[address] [facility]      Yuliana Edwards MD, Asif Campbell MD, MPH      Zayra Durán, SABINE Wong, [facility]     Sil Bishop, [facility]   Ky Quintero, FNP-C    Beto Shankar, [facility]       Jami Zambrano [facility] 136    at [address]    [address] 22.    [phone]    FAX: [address]    [address], [address]    [phone]    FAX: [phone]       Patient Care Team:  Magdalena Arana MD as PCP - General (Family Medicine)  Magdalena Arana MD as PCP - [address] [place] [address] Dr Campa Provider      Problem List  Date Reviewed: 12/30/2020          Codes Class Noted    Dysuria ICD-10-CM: R30.0  ICD-9-CM: 788.1  12/30/2020        History of kidney stones ICD-10-CM: Z87.442  ICD-9-CM: V13.01  72/87/7948        Folic acid deficiency ONC-78-YK: E53.8  ICD-9-CM: 266.2  11/11/2020        Fatty liver ICD-10-CM: K76.0  ICD-9-CM: 571.8  11/11/2020        Diverticulosis ICD-10-CM: K57.90  ICD-9-CM: 562.10  11/11/2020        Hypercholesterolemia ICD-10-CM: E78.00  ICD-9-CM: 272.0  11/6/2020        Obesity, Class II, BMI 35-39.9 ICD-10-CM: E66.9  ICD-9-CM: 278.00  11/6/2020        Gluten intolerance ICD-10-CM: K90.41  ICD-9-CM: 579.0  7/10/2019    Overview Signed 7/10/2019 10:48 AM by Delaney Gage     Had colonoscopy in 6/2017 with Dr. Darius Hebert             Hypertension, essential ICD-10-CM: I10  ICD-9-CM: 401.9  2/21/2018        Fibromyalgia ICD-10-CM: M79.7  ICD-9-CM: 729.1  2/21/2018        IBS (irritable bowel syndrome) ICD-10-CM: K58.9  ICD-9-CM: 564.1  Unknown        Body aches ICD-10-CM: R52  ICD-9-CM: 780.96  5/18/2017        Mild single current episode of major depressive disorder ([facility] ICD-10-CM: F32.0  ICD-9-CM: 296.21 5/18/2017    Overview Signed 5/18/2017  2:25 PM by Stephen Rosen MD     Diagnosed at May 2017 visit                   The clinicians listed above have asked me to see Ty Brooke in consultation regarding elevated liver enzymes and its management. All medical records sent by the referring physicians were reviewed including imaging studies     The patient is a 24 y.o.  male who was found to have intermittent elevated liver transaminases in 2017. Serologic evaluation for markers of chronic liver disease has either not been performed or the results are not available. CT scan of the liver was performed in 4/2020. The results of the imaging suggested fatty liver disease. An assessment of liver fibrosis with biopsy or elastography has not been performed. The patient has the following symptoms which are thought to be due to the liver disease:  fatigue,     The patient is not currently experiencing the following symptoms of liver disease:  pain in the right side over the liver,     The patient completes all daily activities without any functional limitations. ASSESSMENT AND PLAN:  Elevated liver enzymes  Intermittent elevation in liver transaminases of unclear etiology at this time. Have performed laboratory testing to monitor liver function and degree of liver injury. This included BMP, hepatic panel, CBC with platelet count, INR. Liver transaminases are elevated. ALP is normal.  Liver function is normal.  The platelet count is normal.      Based upon laboratory studies and imaging the patient does not appear to have advanced liver disease. Serologic testing for causes of chronic liver disease was ordered.   Results were was positive for JOSE     The most likely causes for the liver chemistry abnormalities were discussed with the patient and include   fatty liver disease, immune liver disorders,     The need to perform an assessment of liver fibrosis was discussed with the patient. The Fibroscan can assess liver fibrosis and determine if a patient has advanced fibrosis or cirrhosis without the need for liver biopsy. This will be performed at the next office visit. If the Fibroscan suggests advanced fibrosis then a liver biopsy should be considered. The Fibroscan can be repeated annually or as often as clinically indicated to assess for fibrosis progression and/or regression. Will perform imaging of the liver with ultrasound. Screening for Hepatocellular Carcinoma  HCC screening is not necessary if the patient has no evidence of cirrhosis. Treatment of other medical problems in patients with chronic liver disease  There are no contraindications for the patient to take most medications that are necessary for treatment of other medical issues. Counseling for alcohol in patients with chronic liver disease  The patient was counseled regarding alcohol consumption and the effect of alcohol on chronic liver disease. The patient does not consume any significant amount of alcohol. Vaccinations   Vaccination for viral hepatitis B is recommended since the patient has no serologic evidence of previous exposure or vaccination with immunity. Vaccination for viral hepatitis A is not needed. The patient has serologic evidence of prior exposure or vaccination with immunity. Routine vaccinations against other bacterial and viral agents can be performed as indicated. Annual flu vaccination should be administered if indicated.       ALLERGIES  Allergies   Allergen Reactions    Gluten Anaphylaxis    Sulfamethoprim Ds Anaphylaxis, Palpitations, Shortness of Breath and Swelling    Topamax [Topiramate] Anaphylaxis    Doxycycline Nausea and Vomiting and Swelling    Zomig [Zolmitriptan] Shortness of Breath       MEDICATIONS  Current Outpatient Medications   Medication Sig    atorvastatin (LIPITOR) 40 mg tablet TAKE 1 TABLET BY MOUTH EVERY DAY    amitriptyline (ELAVIL) 100 mg tablet Take 100 mg by mouth nightly.  pregabalin (LYRICA) 75 mg capsule Take 75 mg by mouth two (2) times a day.  cholecalciferol, vitamin D3, (VITAMIN D3 PO) Take 1 Tab by mouth daily.  pantoprazole (PROTONIX) 20 mg tablet Take 20 mg by mouth daily.  dextroamphetamine-amphetamine (ADDERALL) 30 mg tablet Take 30 mg by mouth daily. 40 mg    TREXIMET  mg tablet TK 1 T PO D PRN    FEXOFENADINE HCL (ALLEGRA PO) Take 1 Tab by mouth daily as needed.  naproxen (NAPROSYN) 500 mg tablet TAKE 1 TABLET BY MOUTH TWICE DAILY WITH MEALS FOR 5 DAYS    ondansetron (Zofran ODT) 4 mg disintegrating tablet Take 1 Tab by mouth every eight (8) hours as needed for Nausea or Vomiting.  diclofenac EC (VOLTAREN) 50 mg EC tablet      No current facility-administered medications for this visit. SYSTEM REVIEW NOT RELATED TO LIVER DISEASE OR REVIEWED ABOVE:  Constitution systems: Negative for fever, chills, weight gain, weight loss. Eyes: Negative for visual changes. ENT: Negative for sore throat, painful swallowing. Respiratory: Negative for cough, hemoptysis, SOB. Cardiology: Negative for chest pain, palpitations. GI:  Negative for constipation or diarrhea. : Negative for urinary frequency, dysuria, hematuria, nocturia. Skin: Negative for rash. Hematology: Negative for easy bruising, blood clots. Musculo-skelatal: Negative for back pain, muscle pain, weakness. Neurologic: Negative for headaches, dizziness, vertigo, memory problems not related to HE. Psychology: Negative for anxiety, depression. FAMILY HISTORY:  The father Has/had the following following chronic disease(s): Fatty liver, HyperCHOL, HTN. The mother Has/had the following chronic disease(s): HyperCHOL, HTN  There is no family history of liver disease. SOCIAL HISTORY:  The patient has never been . The patient has no children. The patient stopped using tobacco products in 9/2020.   Now Vapes    The patient consumes 4-5 alcoholic beverages per week. The patient currently works full time as [employer]. PHYSICAL EXAMINATION:  Visit Vitals  BP (!) 155/96 (BP 1 Location: Left upper arm, BP Patient Position: Sitting, BP Cuff Size: Large adult long)   Pulse (!) 102   Temp 97 °F (36.1 °C) (Temporal)   Resp 16   Ht 5' 6\" (1.676 m)   Wt 224 lb (101.6 kg)   SpO2 97%   BMI 36.15 kg/m²     General: No acute distress. Eyes: Sclera anicteric. ENT: No oral lesions. Thyroid normal.  Nodes: No adenopathy. Skin: No spider angiomata. No jaundice. No palmar erythema. Respiratory: Lungs clear to auscultation. Cardiovascular: Regular heart rate. No murmurs. No JVD. Abdomen: Soft non-tender. Liver size normal to percussion/palpation. Spleen not palpable. No obvious ascites. Extremities: No edema. No muscle wasting. No gross arthritic changes. Neurologic: Alert and oriented. Cranial nerves grossly intact. No asterixis. LABORATORY STUDIES:  Liver [place] of [address] Units 2/19/2021 11/10/2020   WBC 3.4 - 10.8 x10E3/uL 7.4    ANC 1.4 - 7.0 x10E3/uL 4.3    HGB 13.0 - 17.7 g/dL 16.8 16.0    - 450 x10E3/uL 286    INR 0.9 - 1.2 1.0    AST 0 - 40 IU/L 38 32   ALT 0 - 44 IU/L 75 (H) 84 (H)   Alk Phos 39 - 117 IU/L 88 103   Bili, Total 0.0 - 1.2 mg/dL 1.2 1.4 (H)   Bili, Direct 0.00 - 0.40 mg/dL 0.31    Albumin 4.1 - 5.2 g/dL 4.7 4.4   BUN 6 - 20 mg/dL 11 12   Creat 0.76 - 1.27 mg/dL 0.80 0.84   Na 134 - 144 mmol/L 141 139   K 3.5 - 5.2 mmol/L 4.1 4.2   Cl 96 - 106 mmol/L 103 104   CO2 20 - 29 mmol/L 21 27   Glucose 65 - 99 mg/dL 100 (H) 83     2/2020.   CHOL: 221, HBA1C 5.5    SEROLOGIES:  Serologies Latest Ref Rng & Units 2/19/2021 5/18/2017   Hep B Surface Ag Negative Negative    Hep B Core Ab, Total Negative Negative    Hep B Surface AB QL  Non Reactive    Hep C Ab 0.0 - 0.9 s/co ratio <0.1    Ferritin 30 - 400 ng/mL 197    Iron % Saturation 15 - 55 % 29    JOSE Ab, Direct Negative  Negative   JOSE, IFA  Positive (A)    JOSE Pattern  1:160 (H)    ASMCA 0 - 19 Units 4    Ceruloplasmin 16.0 - 31.0 mg/dL 23.6    Alpha-1 antitrypsin level 95 - 164 mg/dL 124      LIVER HISTOLOGY:  Not available or performed    ENDOSCOPIC PROCEDURES:  Not available or performed    RADIOLOGY:  4/2020. CT scan abdomen with IV contrast.  Changes consistent with fatty liver. No liver mass lesions. Normal spleen. No ascites. OTHER TESTING:  Not available or performed    FOLLOW-UP:  All of the issues listed above in the Assessment and Plan were discussed with the patient. All questions were answered. The patient expressed a clear understanding of the above. [address] [place] [address] in 4 weeks for Fibroscan to review all data and determine the treatment plan.       Anya Jensen MD  [facility] 13 of [address] 22.  [phone]  [address] [place] [address]

## 2021-02-20 LAB
A1AT SERPL-MCNC: 124 MG/DL (ref 95–164)
ACTIN IGG SERPL-ACNC: 4 UNITS (ref 0–19)
ALBUMIN SERPL-MCNC: 4.7 G/DL (ref 4.1–5.2)
ALP SERPL-CCNC: 88 IU/L (ref 39–117)
ALT SERPL-CCNC: 75 IU/L (ref 0–44)
AST SERPL-CCNC: 38 IU/L (ref 0–40)
BASOPHILS # BLD AUTO: 0.1 X10E3/UL (ref 0–0.2)
BASOPHILS NFR BLD AUTO: 1 %
BILIRUB DIRECT SERPL-MCNC: 0.31 MG/DL (ref 0–0.4)
BILIRUB SERPL-MCNC: 1.2 MG/DL (ref 0–1.2)
BUN SERPL-MCNC: 11 MG/DL (ref 6–20)
BUN/CREAT SERPL: 14 (ref 9–20)
CALCIUM SERPL-MCNC: 9.4 MG/DL (ref 8.7–10.2)
CERULOPLASMIN SERPL-MCNC: 23.6 MG/DL (ref 16–31)
CHLORIDE SERPL-SCNC: 103 MMOL/L (ref 96–106)
CHOLEST SERPL-MCNC: 221 MG/DL (ref 100–199)
CO2 SERPL-SCNC: 21 MMOL/L (ref 20–29)
CREAT SERPL-MCNC: 0.8 MG/DL (ref 0.76–1.27)
EOSINOPHIL # BLD AUTO: 0.1 X10E3/UL (ref 0–0.4)
EOSINOPHIL NFR BLD AUTO: 1 %
ERYTHROCYTE [DISTWIDTH] IN BLOOD BY AUTOMATED COUNT: 13.2 % (ref 11.6–15.4)
EST. AVERAGE GLUCOSE BLD GHB EST-MCNC: 111 MG/DL
FERRITIN SERPL-MCNC: 197 NG/ML (ref 30–400)
GLUCOSE SERPL-MCNC: 100 MG/DL (ref 65–99)
HAV AB SER QL IA: POSITIVE
HBA1C MFR BLD: 5.5 % (ref 4.8–5.6)
HBV CORE AB SERPL QL IA: NEGATIVE
HBV SURFACE AB SER QL: NON REACTIVE
HBV SURFACE AG SERPL QL IA: NEGATIVE
HCT VFR BLD AUTO: 47.5 % (ref 37.5–51)
HCV AB S/CO SERPL IA: <0.1 S/CO RATIO (ref 0–0.9)
HCV AB SERPL QL IA: NORMAL
HDLC SERPL-MCNC: 31 MG/DL
HGB BLD-MCNC: 16.8 G/DL (ref 13–17.7)
IMM GRANULOCYTES # BLD AUTO: 0 X10E3/UL (ref 0–0.1)
IMM GRANULOCYTES NFR BLD AUTO: 0 %
INR PPP: 1 (ref 0.9–1.2)
IRON SATN MFR SERPL: 29 % (ref 15–55)
IRON SERPL-MCNC: 100 UG/DL (ref 38–169)
LDLC SERPL CALC-MCNC: 123 MG/DL (ref 0–99)
LYMPHOCYTES # BLD AUTO: 2.3 X10E3/UL (ref 0.7–3.1)
LYMPHOCYTES NFR BLD AUTO: 31 %
MCH RBC QN AUTO: 30.8 PG (ref 26.6–33)
MCHC RBC AUTO-ENTMCNC: 35.4 G/DL (ref 31.5–35.7)
MCV RBC AUTO: 87 FL (ref 79–97)
MONOCYTES # BLD AUTO: 0.7 X10E3/UL (ref 0.1–0.9)
MONOCYTES NFR BLD AUTO: 9 %
NEUTROPHILS # BLD AUTO: 4.3 X10E3/UL (ref 1.4–7)
NEUTROPHILS NFR BLD AUTO: 58 %
PLATELET # BLD AUTO: 286 X10E3/UL (ref 150–450)
POTASSIUM SERPL-SCNC: 4.1 MMOL/L (ref 3.5–5.2)
PROT SERPL-MCNC: 7 G/DL (ref 6–8.5)
PROTHROMBIN TIME: 10.3 SEC (ref 9.1–12)
RBC # BLD AUTO: 5.45 X10E6/UL (ref 4.14–5.8)
SODIUM SERPL-SCNC: 141 MMOL/L (ref 134–144)
TIBC SERPL-MCNC: 346 UG/DL (ref 250–450)
TRIGL SERPL-MCNC: 377 MG/DL (ref 0–149)
UIBC SERPL-MCNC: 246 UG/DL (ref 111–343)
VLDLC SERPL CALC-MCNC: 67 MG/DL (ref 5–40)
WBC # BLD AUTO: 7.4 X10E3/UL (ref 3.4–10.8)

## 2021-02-23 ENCOUNTER — TELEPHONE (OUTPATIENT)
Dept: HEMATOLOGY | Age: 21
End: 2021-02-23

## 2021-02-23 LAB
ANA HOMOGEN TITR SER: ABNORMAL {TITER}
ANA TITR SER IF: POSITIVE {TITER}
Lab: ABNORMAL

## 2021-02-23 NOTE — TELEPHONE ENCOUNTER
Talk with patient (mother) she is on the release of information forms HIPPA) in our office. Concerns about + Hep A result--explain to parent that Soniya Chowdary NP will go over all results day of appt 3/11/21--verbalize understanding. Unable to reach the patient--voicemail full unable to leave message.

## 2021-03-08 ENCOUNTER — LAB ONLY (OUTPATIENT)
Dept: FAMILY MEDICINE CLINIC | Age: 21
End: 2021-03-08

## 2021-03-08 DIAGNOSIS — R68.89 TEMPERATURE INTOLERANCE: ICD-10-CM

## 2021-03-08 DIAGNOSIS — Z11.59 NEED FOR HEPATITIS C SCREENING TEST: ICD-10-CM

## 2021-03-08 DIAGNOSIS — K76.0 NAFLD (NONALCOHOLIC FATTY LIVER DISEASE): ICD-10-CM

## 2021-03-09 LAB
FOLATE SERPL-MCNC: 2 NG/ML (ref 5–21)
HCV AB SERPL QL IA: NONREACTIVE
HCV COMMENT,HCGAC: NORMAL
T3FREE SERPL-MCNC: 3.8 PG/ML (ref 2.2–4)
T4 FREE SERPL-MCNC: 1 NG/DL (ref 0.8–1.5)
TSH SERPL DL<=0.05 MIU/L-ACNC: 1.5 UIU/ML (ref 0.36–3.74)
VIT B12 SERPL-MCNC: 397 PG/ML (ref 193–986)

## 2021-03-11 ENCOUNTER — HOSPITAL ENCOUNTER (OUTPATIENT)
Dept: NUTRITION | Age: 21
Discharge: HOME | End: 2021-03-11
Attending: FAMILY MEDICINE
Payer: COMMERCIAL

## 2021-03-11 ENCOUNTER — OFFICE VISIT (OUTPATIENT)
Dept: HEMATOLOGY | Age: 21
End: 2021-03-11
Payer: COMMERCIAL

## 2021-03-11 VITALS
RESPIRATION RATE: 16 BRPM | BODY MASS INDEX: 34.76 KG/M2 | TEMPERATURE: 97.9 F | DIASTOLIC BLOOD PRESSURE: 90 MMHG | SYSTOLIC BLOOD PRESSURE: 135 MMHG | OXYGEN SATURATION: 98 % | HEART RATE: 96 BPM | WEIGHT: 216.3 LBS | HEIGHT: 66 IN

## 2021-03-11 DIAGNOSIS — E66.9 OBESITY, CLASS II, BMI 35-39.9: ICD-10-CM

## 2021-03-11 DIAGNOSIS — K75.81 NASH (NONALCOHOLIC STEATOHEPATITIS): ICD-10-CM

## 2021-03-11 DIAGNOSIS — Z87.442 HISTORY OF KIDNEY STONES: ICD-10-CM

## 2021-03-11 DIAGNOSIS — K76.0 FATTY LIVER: ICD-10-CM

## 2021-03-11 DIAGNOSIS — K76.0 FATTY LIVER: Primary | ICD-10-CM

## 2021-03-11 PROCEDURE — 99214 OFFICE O/P EST MOD 30 MIN: CPT | Performed by: NURSE PRACTITIONER

## 2021-03-11 PROCEDURE — 91200 LIVER ELASTOGRAPHY: CPT | Performed by: NURSE PRACTITIONER

## 2021-03-11 PROCEDURE — 97802 MEDICAL NUTRITION INDIV IN: CPT | Performed by: DIETITIAN, REGISTERED

## 2021-03-11 NOTE — PROGRESS NOTES
Identified pt with two pt identifiers(name and ). Reviewed record in preparation for visit and have obtained necessary documentation. Chief Complaint   Patient presents with    Fatty Liver     Fibroscan f/u per MLS      Vitals:    21 0956   BP: (!) 135/90   Pulse: 96   Resp: 16   Temp: 97.9 °F (36.6 °C)   TempSrc: Temporal   SpO2: 98%   Weight: 216 lb 4.8 oz (98.1 kg)   Height: 5' 6\" (1.676 m)   PainSc:   0 - No pain       Health Maintenance Review: Patient reminded of \"due or due soon\" health maintenance. I have asked the patient to contact his/her primary care provider (PCP) for follow-up on his/her health maintenance. Coordination of Care Questionnaire:  :   1) Have you been to an emergency room, urgent care, or hospitalized since your last visit? If yes, where when, and reason for visit? no       2. Have seen or consulted any other health care provider since your last visit? If yes, where when, and reason for visit? YES, Neurologist 3/8/21 and rheumatologist 3/2/21      Patient is accompanied by self I have received verbal consent from Jonoy Borne to discuss any/all medical information while they are present in the room.

## 2021-03-11 NOTE — PROGRESS NOTES
[address] [facility]      Mabel Mallory MD, Karolina Hernandez, Stephanie Daniel MD, MPH      Rakel Hahn, PA-C    Lucille Dempsey, ACN-BC     Sil BROCK Ed[place], [facility]   Ledy Burkett, FNP-C    Worth Goodpasture, [facility]       Jami Zambrano [address] 136    at [address], [address] [facility] Lori Olvera  22.    [phone]    FAX: [address]    [place] [address], [address], [address]    [phone]    FAX: [phone]     Patient Care Team:  Maty Landeros MD as PCP - General (Family Medicine)  Maty Landeros MD as PCP - [facility]    Problem List  Date Reviewed: 12/30/2020          Codes Class Noted    Dysuria ICD-10-CM: R30.0  ICD-9-CM: 788.1  12/30/2020        History of kidney stones ICD-10-CM: Z87.442  ICD-9-CM: V13.01  30/57/4101        Folic acid deficiency TOX-07-GT: E53.8  ICD-9-CM: 266.2  11/11/2020        Fatty liver ICD-10-CM: K76.0  ICD-9-CM: 571.8  11/11/2020        Diverticulosis ICD-10-CM: K57.90  ICD-9-CM: 562.10  11/11/2020        Hypercholesterolemia ICD-10-CM: E78.00  ICD-9-CM: 272.0  11/6/2020        Obesity, Class II, BMI 35-39.9 ICD-10-CM: E66.9  ICD-9-CM: 278.00  11/6/2020        Gluten intolerance ICD-10-CM: K90.41  ICD-9-CM: 579.0  7/10/2019    Overview Signed 7/10/2019 10:48 AM by Elaine Fleming     Had colonoscopy in 6/2017 with Dr. Augustine Haddad             Hypertension, essential ICD-10-CM: I10  ICD-9-CM: 401.9  2/21/2018        Fibromyalgia ICD-10-CM: M79.7  ICD-9-CM: 729.1  2/21/2018        IBS (irritable bowel syndrome) ICD-10-CM: K58.9  ICD-9-CM: 564.1  Unknown        Body aches ICD-10-CM: R52  ICD-9-CM: 780.96  5/18/2017        Mild single current episode of major depressive disorder ([facility] ICD-10-CM: F32.0  ICD-9-CM: 296.21 5/18/2017    Overview Signed 5/18/2017  2:25 PM by Karry Gaucher, MD     Diagnosed at May 2017 visit                 Nancy Angela is being seen at [address] for management of   elevated liver enzymes. The active problem list, all pertinent past medical history, medications, radiologic findings and laboratory findings related to the liver disorder were reviewed and discussed with the patient. The patient is a 24 y.o.  male who was found to have intermittent elevated liver transaminases in 2017. Serologic evaluation for markers of chronic liver disease was positive for JOSE. CT scan of the liver was performed in 4/2020. The results of the imaging suggested fatty liver disease. An assessment of liver fibrosis with elastography will be performed during this office visit. The patient has the following symptoms which are thought to be due to the liver disease: fatigue. The patient is not currently experiencing the following symptoms of liver disease: pain in the right side over the liver, yellowing of the eyes or skin, peripheral edema or itching. The patient completes all daily activities without any functional limitations. ASSESSMENT AND PLAN:  Fatty liver disease with no fibrosis. Have performed laboratory testing to monitor liver function and degree of liver injury. This included BMP, hepatic panel, CBC with platelet count, INR. Liver transaminases are elevated. ALP is normal. Liver function is normal. The platelet count is normal.      Serologic testing for causes of chronic liver disease was ordered. It was positive for JOSE. Counseling for diet and weight loss in patients with confirmed or suspected NAFLD  The patient was counseled regarding diet and exercise to achieve weight loss.  The best diet for patients with fatty liver is one very low in carbohydrates and enriched with protein such as an Kati's program. We reviewed this in detail and a handout was provided. He is meeting with a nutritionist today. He has a history of an eating disorder and has a gluten allergy. I will defer to the plan made later today. I have reviewed principles of weight loss but I do not want him to severely restrict his caloric intake. He needs to develop healthy eating patterns. He has already reviewed the [address] and thinks that may work best for him long term. I usually use the pseudo keto diet to get weight off quickly and then transition patients to the Mediterranean diet after a good amount of weight loss has been achieved. The patient was told not to consume any food products and drinks containing fructose as this enhances hepatic fat synthesis. There is no medication or vitamin supplements we advocate for FOSTER. Using glitazones in patients without diabetes mellitus has been shown to reduce fat content in the liver but has no effect on fibrosis and is associated with weight gain. Vitamin E has also been used but the data is not very good and most experts no longer advocate this. Positive JOSE  Results were positive for JOSE but his enzyme elevation pattern are not consistent with an AIH presentation. Hypertension  He has hereditary hypertension and has had it since he was a child. Screening for hepatocellular carcinoma  HCC screening is not necessary if the patient has no evidence of cirrhosis. Treatment of other medical problems in patients with chronic liver disease  There are no contraindications for the patient to take most medications necessary for treatment of other medical issues. The patient can take any medications utilized for treatment of DM and/or statins to treat hypercholesterolemia. He has been on cholesterol medication since he was 10years old. The patient does not consume alcohol on a daily basis.  Normal doses of acetaminophen, as recommended on the label of the bottle, are not hepatotoxic except in the setting of daily alcohol use, even in patients with cirrhosis and can be utilized for pain. Counseling for alcohol in patients with chronic liver disease  The patient was counseled regarding alcohol consumption and the effect of alcohol on chronic liver disease. The patient does not consume any significant amount of alcohol. Vaccinations   Recommend vaccination against viral hepatitis B, as there is no documented immunity. If he has had the series before, he can get a booster. He is immune against viral hepatitis A. He does not have an active infection. Routine vaccinations against other bacterial and viral agents can be performed as indicated. Annual flu vaccination should be administered if indicated. ALLERGIES  Allergies   Allergen Reactions    Gluten Anaphylaxis    Sulfamethoprim Ds Anaphylaxis, Palpitations, Shortness of Breath and Swelling    Topamax [Topiramate] Anaphylaxis    Doxycycline Nausea and Vomiting and Swelling    Zomig [Zolmitriptan] Shortness of Breath     MEDICATIONS  Current Outpatient Medications   Medication Sig    naproxen (NAPROSYN) 500 mg tablet TAKE 1 TABLET BY MOUTH TWICE DAILY WITH MEALS FOR 5 DAYS    atorvastatin (LIPITOR) 40 mg tablet TAKE 1 TABLET BY MOUTH EVERY DAY    amitriptyline (ELAVIL) 100 mg tablet Take 100 mg by mouth nightly.  ondansetron (Zofran ODT) 4 mg disintegrating tablet Take 1 Tab by mouth every eight (8) hours as needed for Nausea or Vomiting.  diclofenac EC (VOLTAREN) 50 mg EC tablet     pregabalin (LYRICA) 75 mg capsule Take 75 mg by mouth two (2) times a day.  cholecalciferol, vitamin D3, (VITAMIN D3 PO) Take 1 Tab by mouth daily.  pantoprazole (PROTONIX) 20 mg tablet Take 20 mg by mouth daily.  dextroamphetamine-amphetamine (ADDERALL) 30 mg tablet Take 30 mg by mouth daily. 40 mg    TREXIMET  mg tablet TK 1 T PO D PRN    FEXOFENADINE HCL (ALLEGRA PO) Take 1 Tab by mouth daily as needed.      No current facility-administered medications for this visit. SYSTEM REVIEW NOT RELATED TO LIVER DISEASE OR REVIEWED ABOVE:  Constitution systems: Negative for fever, chills, weight gain, weight loss. Eyes: Negative for visual changes. ENT: Negative for sore throat, painful swallowing. Respiratory: Negative for cough, hemoptysis, SOB. Cardiology: Negative for chest pain, palpitations. GI:  Negative for constipation or diarrhea. : Negative for urinary frequency, dysuria, hematuria, nocturia. Skin: Negative for rash. Hematology: Negative for easy bruising, blood clots. Musculo-skeletal: Negative for back pain, muscle pain, weakness. Neurologic: Negative for headaches, dizziness, vertigo, memory problems not related to HE. Psychology: Negative for anxiety, depression. FAMILY HISTORY:  The father has/had the following chronic disease(s): fatty liver, HyperCHOL, HTN. The mother has/had the following chronic disease(s): HyperCHOL, HTN  There is no family history of liver disease. SOCIAL HISTORY:  The patient has never been . The patient has no children. The patient stopped using tobacco products in 9/2020. Now vapes. The patient consumes 4-5 alcoholic beverages per week. 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 He works part time at [employer]. PHYSICAL EXAMINATION:  Visit Vitals  BP (!) 135/90 (BP 1 Location: Left upper arm, BP Patient Position: Sitting, BP Cuff Size: Adult)   Pulse 96   Temp 97.9 °F (36.6 °C) (Temporal)   Resp 16   Ht 5' 6\" (1.676 m)   Wt 216 lb 4.8 oz (98.1 kg)   SpO2 98%   BMI 34.91 kg/m²     General: No acute distress. Obese. Eyes: Sclera anicteric. ENT: No oral lesions. Nodes: No adenopathy. Skin: No spider angiomata. No jaundice. No palmar erythema. Respiratory: Lungs clear to auscultation. Cardiovascular: Regular heart rate. No murmurs. No JVD. Abdomen: Soft non-tender, liver size normal to percussion/palpation. Spleen not palpable.  No obvious ascites. Extremities: No edema. No muscle wasting. No gross arthritic changes. Neurologic: Alert and oriented. Cranial nerves grossly intact. No asterixis. LABORATORY STUDIES:  Liver [place] of [address] Units 2/19/2021 11/10/2020   WBC 3.4 - 10.8 x10E3/uL 7.4    ANC 1.4 - 7.0 x10E3/uL 4.3    HGB 13.0 - 17.7 g/dL 16.8 16.0    - 450 x10E3/uL 286    INR 0.9 - 1.2 1.0    AST 0 - 40 IU/L 38 32   ALT 0 - 44 IU/L 75 (H) 84 (H)   Alk Phos 39 - 117 IU/L 88 103   Bili, Total 0.0 - 1.2 mg/dL 1.2 1.4 (H)   Bili, Direct 0.00 - 0.40 mg/dL 0.31    Albumin 4.1 - 5.2 g/dL 4.7 4.4   BUN 6 - 20 mg/dL 11 12   Creat 0.76 - 1.27 mg/dL 0.80 0.84   Na 134 - 144 mmol/L 141 139   K 3.5 - 5.2 mmol/L 4.1 4.2   Cl 96 - 106 mmol/L 103 104   CO2 20 - 29 mmol/L 21 27   Glucose 65 - 99 mg/dL 100 (H) 83     2/2020. CHOL: 221, A1C 5.5    SEROLOGIES:  Serologies Latest Ref Rng & Units 2/19/2021 5/18/2017   Hep B Surface Ag Negative Negative    Hep B Core Ab, Total Negative Negative    Hep B Surface AB QL  Non Reactive    Hep C Ab 0.0 - 0.9 s/co ratio <0.1    Ferritin 30 - 400 ng/mL 197    Iron % Saturation 15 - 55 % 29    JOSE Ab, Direct Negative  Negative   JOSE, IFA  Positive (A)    JOSE Pattern  1:160 (H)    ASMCA 0 - 19 Units 4    Ceruloplasmin 16.0 - 31.0 mg/dL 23.6    Alpha-1 antitrypsin level 95 - 164 mg/dL 124      LIVER HISTOLOGY:  3/2021. FibroScan performed at [address]. EkPa was 3.4. IQR/med 9%. . The results suggested a fibrosis level of F0. The CAP score suggests fatty liver. ENDOSCOPIC PROCEDURES:  Not available or performed    RADIOLOGY:  4/2020. CT scan abdomen with IV contrast. Changes consistent with fatty liver. No liver mass lesions. Normal spleen. No ascites. OTHER TESTING:  Not available or performed    FOLLOW-UP:  All of the issues listed above in the assessment and plan were discussed with the patient. All questions were answered.  The patient expressed a clear understanding of the above. [facility] in 2 months to assess weight loss. This visit totaled 35 minutes with time used to review recent lab results, direct patient interaction and assessment, review medications, answer patient questions and documentation of the findings in my note.       Alejandro Hernández, [facility]  Liver [place] [address] [address] Lori Doan  22.  [phone]

## 2021-03-12 NOTE — PROGRESS NOTES
Michaela Ahumada was informed of the inherent limitations of a virtual visit,  and has consented to a virtual therapy visit on 3/11/2021. The patient was informed that at any time during the virtual visit, they can decide to stop the virtual visit. The patient verified that they are physically located in the [facility] for this virtual visit. [address] [facility]     Nutrition Assessment - Medical Nutrition Therapy   Outpatient Initial Evaluation         Patient Name: Michaela Ahumada : 2000   Treatment Diagnosis: FOSTER, obesity, hx of urinary calculi   Referral Source: Trey Correa, * Start of Care ([facility]): 3/11/2021     In time:   12:00             Out time:   1:00   Total Treatment Time (min):   60     Gender: male Age: 24 y.o.    Ht: 66 in Wt: 200 (pt scale)  lb 90.9 kg   BMI: 32.4 BMR   Male 12 BMR Female      Past Medical History:  Patient Active Problem List   Diagnosis Code    Body aches R52    Mild single current episode of major depressive disorder (HCC) F32.0    Hypertension, essential I10    Fibromyalgia M79.7    IBS (irritable bowel syndrome) K58.9    Gluten intolerance K90.41    Hypercholesterolemia E78.00    Obesity, Class II, BMI 83-27.9 N54.9    Folic acid deficiency W83.5    Fatty liver K76.0    Diverticulosis K57.90    Dysuria R30.0    History of kidney stones Z87.442        Pertinent Medications:     Current Outpatient Medications:     naproxen (NAPROSYN) 500 mg tablet, TAKE 1 TABLET BY MOUTH TWICE DAILY WITH MEALS FOR 5 DAYS, Disp: 10 Tab, Rfl: 0    atorvastatin (LIPITOR) 40 mg tablet, TAKE 1 TABLET BY MOUTH EVERY DAY, Disp: 90 Tab, Rfl: 1    amitriptyline (ELAVIL) 100 mg tablet, Take 100 mg by mouth nightly., Disp: , Rfl:     ondansetron (Zofran ODT) 4 mg disintegrating tablet, Take 1 Tab by mouth every eight (8) hours as needed for Nausea or Vomiting., Disp: 20 Tab, Rfl: 0    diclofenac EC (VOLTAREN) 50 mg EC tablet, , Disp: , Rfl:   •  pregabalin (LYRICA) 75 mg capsule, Take 75 mg by mouth two (2) times a day., Disp: , Rfl:   •  cholecalciferol, vitamin D3, (VITAMIN D3 PO), Take 1 Tab by mouth daily., Disp: , Rfl:   •  pantoprazole (PROTONIX) 20 mg tablet, Take 20 mg by mouth daily., Disp: , Rfl:   •  dextroamphetamine-amphetamine (ADDERALL) 30 mg tablet, Take 30 mg by mouth daily. 40 mg, Disp: , Rfl:   •  TREXIMET  mg tablet, TK 1 T PO D PRN, Disp: , Rfl: 3  •  FEXOFENADINE HCL (ALLEGRA PO), Take 1 Tab by mouth daily as needed., Disp: , Rfl:      Biochemical Data:   Lab Results   Component Value Date/Time    Hemoglobin A1c 5.5 02/19/2021 05:04 PM     Lab Results   Component Value Date/Time    Sodium 141 02/19/2021 05:04 PM    Potassium 4.1 02/19/2021 05:04 PM    Chloride 103 02/19/2021 05:04 PM    CO2 21 02/19/2021 05:04 PM    Anion gap 8 11/10/2020 02:31 PM    Glucose 100 (H) 02/19/2021 05:04 PM    BUN 11 02/19/2021 05:04 PM    Creatinine 0.80 02/19/2021 05:04 PM    BUN/Creatinine ratio 14 02/19/2021 05:04 PM    GFR est  02/19/2021 05:04 PM    GFR est non- 02/19/2021 05:04 PM    Calcium 9.4 02/19/2021 05:04 PM    Bilirubin, total 1.2 02/19/2021 05:04 PM    Alk. phosphatase 88 02/19/2021 05:04 PM    Protein, total 7.0 02/19/2021 05:04 PM    Albumin 4.7 02/19/2021 05:04 PM    Globulin 2.9 11/10/2020 02:31 PM    A-G Ratio 1.5 11/10/2020 02:31 PM    ALT (SGPT) 75 (H) 02/19/2021 05:04 PM    AST (SGOT) 38 02/19/2021 05:04 PM     Lab Results   Component Value Date/Time    Cholesterol, total 221 (H) 02/19/2021 05:04 PM    HDL Cholesterol 31 (L) 02/19/2021 05:04 PM    LDL, calculated 123 (H) 02/19/2021 05:04 PM    LDL, calculated 139 (H) 11/10/2020 02:31 PM    VLDL, calculated 67 (H) 02/19/2021 05:04 PM    VLDL, calculated 32 11/10/2020 02:31 PM    Triglyceride 377 (H) 02/19/2021 05:04 PM    CHOL/HDL Ratio 6.5 (H) 11/10/2020 02:31 PM        Assessment:   Pt seen for FOSTER, obesity, hx of urinary calculi; noted HTN,  hypercholesterolemia, IBS, gluten intolerance. Pt states issues with bulemia/anorexia in past, hx of depression- per pt will be having mental health evaluation soon. Working 2 jobs- 60-70 hours per week. Eating 1-2 meals a day; mostly fast food for dinner (knows how to cook). Drinking sugary drinks daily; 6-7 alcoholic drinks per week but states has drank 20-30 shots at one time. Numerous kidney stones- oxalate. No formal exercise. Food & Nutrition: B- skips  L- salad or skips  D- GF pizza (at work) or burger (no bun), fries  S- none  Drinks: tea, water, 32 oz soda/day, 6-7 alcoholic drinks per week; excessive at times (20-30 shots at one time)         Estimate Needs = Equation( [x] MSJ ; []  HBE; [] Alfonso Bobo; [] other)  * Activity Factor (x1.3-1.4) - 500   Calories: 0981-5075  Protein: 100 Carbs: 225 Fat: 78   Kcal/day  g/day  g/day  g/day        percent: 20  45  35               Nutrition Diagnosis Obesity R/T excessive caloric intake, skipping meals AEB pt skipping meals frequently; binge eating at times    Food and nutrition related knowledge deficit R/T kidney stones and FOSTER AEB pt seeking nutrition education for both conditions. Nutrition Intervention &  Education: Discussed importance of 3 meals a day (no skipping) using balanced plate (1/4 lean protein, 1/4 complex carbs, 1/2 non-starchy veggies); emphasized importance of reducing carbs with FOSTER; encouraged pt to reduce/discontinue soda and limit alcohol. Discussed planning meals and consuming more meals at home versus going out to eat. Reviewed high oxalate foods to avoid and importance of increased fluids. Encouraged pt to begin exercise routine.       Handouts Provided: [x]  Carbohydrates  []  Protein  []  Non-starchy Vegatbles  []  Food Label  []  Meal and Snack Ideas  []  Food Journals []  Diabetes  []  Cholesterol  [x]  Sodium  []  Gen Nutr Guidelines  []  SBGM Guidelines  [x]  Others: Balanced plate, Kidney stones   Information Reviewed with: Patient   Readiness to Change Stage: []  Pre-contemplative    []  Contemplative  [x]  Preparation               []  Action                  []  Maintenance   Potential Barriers to Learning:                      []  Decline in memory    []  Language barrier   []  Other:  []  Emotional                  []  Limited mobility     c  []  Lack of motivation     [] Vision, hearing or cognitive impairment   Expected Compliance: Good      Nutritional Goal - To promote lifestyle changes to result in:    [x]  Weight loss  []  Improved diabetic control  []  Decreased cholesterol levels  []  Decreased blood pressure  []  Weight maintenance []  Preventing any interactions associated with food allergies  []  Adequate weight gain toward goal weight  [x]  Other:        Patient Goals:   1. Limit soda to 3 per week  2. Increase water to 8 glasses per week  3. Limit fast food to 2-3x week  4. Avoid foods high in oxalate     Dietitian Signature: Marlene Manzanares RD Date: 3/11/2021   Follow-up: 1 month Time: 9:03 PM   Sherwin Everett is a 24 y.o. male being evaluated by a Virtual Visit (video visit) encounter to address concerns as mentioned above. A caregiver was present when appropriate. Due to this being a TeleHealth encounter (During LRQSA-18 public health emergency), evaluation of the following areas was limited: Nutrition Focused Physical Exam. Pursuant to the emergency declaration under the [address] [place] [address] authority and the [employer] General Act, this Virtual Visit was conducted with patient's (and/or legal guardian's) consent, to reduce the risk of exposure to COVID-19 and provide necessary medical care. Services were provided through a video synchronous discussion virtually to substitute for in-person encounter.     --Bradford Castleman, RD on 3/11/2021 at 9:03 PM    An electronic signature was used to authenticate this note.

## 2021-06-11 ENCOUNTER — TELEPHONE (OUTPATIENT)
Dept: FAMILY MEDICINE CLINIC | Age: 21
End: 2021-06-11

## 2021-06-11 NOTE — TELEPHONE ENCOUNTER
----- Message from Kishore Araiza sent at 6/11/2021  3:02 PM EDT -----  Regarding: Dr. Maynor Courtney: [phone]  Appointment not available    Caller's first and last name and relationship to patient (if not the patient): N/A      Best contact number:[phone]      Preferred date and time: First available, anytime      Scheduled appointment date and time: N/A      Reason for appointment: CPE with blood work and knee issues. Details to clarify the request: Patient would like Stephanie to call him back.        Kishore Araiza

## 2021-07-21 ENCOUNTER — OFFICE VISIT (OUTPATIENT)
Dept: FAMILY MEDICINE CLINIC | Age: 21
End: 2021-07-21
Payer: COMMERCIAL

## 2021-07-21 VITALS
DIASTOLIC BLOOD PRESSURE: 87 MMHG | TEMPERATURE: 98.7 F | RESPIRATION RATE: 16 BRPM | SYSTOLIC BLOOD PRESSURE: 134 MMHG | HEIGHT: 66 IN | OXYGEN SATURATION: 97 % | BODY MASS INDEX: 36.9 KG/M2 | WEIGHT: 229.6 LBS | HEART RATE: 102 BPM

## 2021-07-21 DIAGNOSIS — E53.8 FOLATE DEFICIENCY: ICD-10-CM

## 2021-07-21 DIAGNOSIS — Z11.3 ROUTINE SCREENING FOR STI (SEXUALLY TRANSMITTED INFECTION): ICD-10-CM

## 2021-07-21 DIAGNOSIS — Z00.00 WELL ADULT EXAM: Primary | ICD-10-CM

## 2021-07-21 DIAGNOSIS — E78.2 MIXED HYPERLIPIDEMIA: ICD-10-CM

## 2021-07-21 DIAGNOSIS — M76.52 PATELLAR TENDONITIS OF LEFT KNEE: ICD-10-CM

## 2021-07-21 PROCEDURE — 99395 PREV VISIT EST AGE 18-39: CPT | Performed by: FAMILY MEDICINE

## 2021-07-21 RX ORDER — DICLOFENAC SODIUM 10 MG/G
2 GEL TOPICAL 4 TIMES DAILY
Qty: 100 G | Refills: 1 | Status: SHIPPED | OUTPATIENT
Start: 2021-07-21 | End: 2022-03-24 | Stop reason: ALTCHOICE

## 2021-07-21 RX ORDER — ARIPIPRAZOLE 5 MG/1
5 TABLET ORAL DAILY
COMMUNITY
End: 2021-08-15 | Stop reason: DRUGHIGH

## 2021-07-21 NOTE — PROGRESS NOTES
Chief Complaint   Patient presents with    Physical     Patient presents for routine CPE with STD check. Visit Vitals  /87 (BP 1 Location: Left upper arm, BP Patient Position: Sitting, BP Cuff Size: Large adult)   Pulse (!) 102   Temp 98.7 °F (37.1 °C) (Oral)   Resp 16   Ht 5' 6\" (1.676 m)   Wt 229 lb 9.6 oz (104.1 kg)   SpO2 97%   BMI 37.06 kg/m²        1. Have you been to the ER, urgent care clinic since your last visit? Hospitalized since your last visit? No    2. Have you seen or consulted any other health care providers outside of the [address] [place] [address] since your last visit? Include any pap smears or colon screening.  No

## 2021-07-21 NOTE — PATIENT INSTRUCTIONS
Patellar Tendinitis (Jumper's Knee): Exercises  Introduction  Here are some examples of exercises for you to try. The exercises may be suggested for a condition or for rehabilitation. Start each exercise slowly. Ease off the exercises if you start to have pain. You will be told when to start these exercises and which ones will work best for you. How to do the exercises  Straight-leg raises to the front   1. Lie on your back with your good knee bent so that your foot rests flat on the floor. Your affected leg should be straight. Make sure that your low back has a normal curve. You should be able to slip your hand in between the floor and the small of your back, with your palm touching the floor and your back touching the back of your hand. 2. Tighten the thigh muscles in your affected leg by pressing the back of your knee flat down to the floor. Hold your knee straight. 3. Keeping the thigh muscles tight and your leg straight, lift your leg up so that your heel is about 12 inches off the floor. 4. Hold for about 6 seconds, then lower your leg slowly. Rest for up to 10 seconds between repetitions. 5. Repeat 8 to 12 times. Short-arc quad   1. Lie on your back with your knees bent over a foam roll or large rolled-up towel and your heels on the floor. 2. Lift the lower part of your affected leg until your leg is straight. Keep the back of your knee on the foam roll or towel. 3. Hold your leg straight for about 6 seconds, then slowly bend your knee and lower your heel back to the floor. Rest for up to 10 seconds between repetitions. 4. Repeat 8 to 12 times. Half-squat with knees and feet turned out to the side   1. Stand with your feet about shoulder-width apart and turned out to the side about 45 degrees. 2. Keep your back straight, and tighten your buttocks. 3. Slowly bend your knees to lower your body about one-quarter of the way down toward the floor.  Try to keep your back straight at all times, and do not let your pelvis tilt forward or your knees extend beyond the tip of your toes. 4. Repeat 8 to 12 times. Step up   1. Place a single-step footstool on the floor. If you do not have a footstool, you can use a thick book, such as a phone book, a dictionary, or an encyclopedia. If you are not steady on your feet, hold on to a chair, counter, or wall while you do this exercise. 2. Keeping your back straight, step up with your affected leg. Try not to push off your back leg as you step up. Only use your affected leg to bring you up on to the step. Then lift your other leg on to the step. As you step up, try to keep your knee moving in a straight line with your middle toe. 3. Move back to the starting position, with both feet on the floor. 4. Repeat 8 to 12 times. Step down   1. Stand on a single-step footstool. If you do not have a footstool, you can use a thick book, such as a phone book, a dictionary, or an encyclopedia. If you are not steady on your feet, hold on to a chair, counter, or wall while you do this exercise. 2. Slowly step down with your good leg, allowing your heel to lightly touch the floor. As you step down, try to keep your affected knee moving in a straight line toward your middle toe. 3. Move back to the starting position, with both feet on the footstool or book. 4. Repeat 8 to 12 times. Terminal knee extension   1. Tie the ends of an exercise band together to form a loop. Attach one end of the loop to a secure object, or shut a door on it to hold it in place. (Or you can have someone hold one end of the loop to provide resistance.)  2. Loop the other end of the exercise band around the knee of your affected leg. Keep that leg somewhat bent at the knee. 3. Put your good leg about a step behind your affected leg. Then slowly straighten your affected leg by tightening the thigh muscles of that leg.   4. Hold for about 6 seconds, then return to the starting position, with your knee somewhat bent. 5. Rest for up to 10 seconds. 6. Repeat 8 to 12 times. Follow-up care is a key part of your treatment and safety. Be sure to make and go to all appointments, and call your doctor if you are having problems. It's also a good idea to know your test results and keep a list of the medicines you take. Where can you learn more? Go to http://www.gray.com/  Enter T849 in the search box to learn more about \"Patellar Tendinitis (Jumper's Knee): Exercises. \"  Current as of: November 16, 2020               Content Version: 12.8  © 2906-7439 [employer], [employer]. Care instructions adapted under license by [employer] (which disclaims liability or warranty for this information). If you have questions about a medical condition or this instruction, always ask your healthcare professional. [address] any warranty or liability for your use of this information.

## 2021-07-21 NOTE — PROGRESS NOTES
Lashay Guadalupe  24 y.o. male  2000  [address] [facility]  [id number]   [address]: Progress Note  Ray Rouse MD       Encounter Date: 7/21/2021    Chief Complaint   Patient presents with    Physical     Patient presents for routine CPE with STD check. History of Present Illness   Lashay Guadalupe is a 24 y.o. male who presents to clinic today for a general physical.  Also notes some left knee pain. Some physical activity. Diet could be better. Also followed by psychiatry. Diagnosed with bipolar 1 on abilify and ADHD on Vyvanse    Review of Systems   Review of Systems   Constitutional: Negative for chills and fever. HENT: Negative for congestion and sore throat. Eyes: Negative for blurred vision and double vision. Respiratory: Negative for cough, shortness of breath and wheezing. Cardiovascular: Negative for chest pain and palpitations. Gastrointestinal: Negative for abdominal pain, constipation, diarrhea and nausea. Genitourinary: Negative for dysuria and hematuria. Musculoskeletal: Positive for joint pain (Knee pain). Negative for back pain, falls and myalgias. Skin: Negative for rash. Neurological: Negative for dizziness, tremors and headaches. Psychiatric/Behavioral: Negative for depression. The patient is not nervous/anxious. All other systems reviewed and are negative. Vitals/Objective:     Vitals:    07/21/21 1621   BP: 134/87   Pulse: (!) 102   Resp: 16   Temp: 98.7 °F (37.1 °C)   TempSrc: Oral   SpO2: 97%   Weight: 229 lb 9.6 oz (104.1 kg)   Height: 5' 6\" (1.676 m)     Body mass index is 37.06 kg/m². General: Patient alert and oriented and in NAD  HEENT: PER/EOMI, no conjunctival pallor or scleral icterus. No thyromegaly or cervical lymphadenopathy  Heart: Regular rate and rhythm, No murmurs, rubs or gallops.   2+ peripheral pulses  Lungs: Clear to auscultation bilaterally, no wheezing, rales or rhonchi  Abd: +BS, non-tender, non-distended  MSK: Tenderness along left patellar tendon, otherwise normal  Ext: No edema  Skin: No rashes or lesions noted on exposed skin,  Psych: Appropriate mood and affect    Assessment and Plan:   1. Well adult exam  Mg Echeverria was counseled on age-appropriate/ guideline-based risk prevention behaviors and screening for a 24y.o. year old   male . We also discussed adjustments in screening based on family history if necessary. Printed instructions for preventative screening guidelines and healthy behaviors given to patient with after visit summary. 2. Routine screening for STI (sexually transmitted infection)  - HIV 1/2 AG/AB, 4TH GENERATION,W RFLX CONFIRM; Future  - HEP B SURFACE AG; Future  - RPR; Future  - Mateo Favre / GC-AMPLIFIED; Future    3. Folate deficiency  - VITAMIN B12 & FOLATE; Future    4. Mixed hyperlipidemia  - LIPID PANEL; Future  - METABOLIC PANEL, COMPREHENSIVE; Future    5. Patellar tendonitis of left knee  NSAID, HEP, Ice.    - diclofenac (VOLTAREN) 1 % gel; Apply 2 g to affected area four (4) times daily. Use for up to 2 weeks  Dispense: 100 g; Refill: 1      I have discussed the diagnosis with the patient and the intended plan as seen in the above orders. he has expressed understanding. The patient has received an after-visit summary and questions were answered concerning future plans. I have discussed medication side effects and warnings with the patient as well. Follow-up and Dispositions  ·   Return in about 6 months (around 1/21/2022). Electronically Signed: Yue Chan MD     History   Patients past medical, surgical and family histories were reviewed and updated.     Past Medical History:   Diagnosis Date    ADHD (attention deficit hyperactivity disorder)     Anemia     Anorexia 05/26/2021    [facility]ipolar 1 disorder ([facility] 05/26/2021    Calculus of kidney     2018    Fibromyalgia     GERD (gastroesophageal reflux disease)     Hypercholesteremia     Hypertension, essential 2018    IBS (irritable bowel syndrome)     Liver disease     non-alcholic fatty liver    Migraine     Psoriasis     Vitamin B deficiency      Past Surgical History:   Procedure Laterality Date    COLONOSCOPY N/A 2017    [facility]      wisdom teeth     History reviewed. No pertinent family history. Social History     Socioeconomic History    Marital status: SINGLE     Spouse name: Not on file    Number of children: Not on file    Years of education: Not on file    Highest education level: Not on file   Occupational History    Not on file   Tobacco Use    Smoking status: Former Smoker     Quit date: 2020     Years since quittin.9    Smokeless tobacco: Never Used   Vaping Use    Vaping Use: Every day    Substances: Nicotine    Devices: Pre-filled pod   Substance and Sexual Activity    Alcohol use: Yes     Alcohol/week: 4.0 standard drinks     Types: 4 Shots of liquor per week     Comment: 7- 10 drinks weekly    Drug use: No    Sexual activity: Not Currently     Partners: Male   Other Topics Concern    Not on file   Social History Narrative    Not on file     Social Determinants of Health     Financial Resource Strain:     Difficulty of Paying Living Expenses:    Food Insecurity:     Worried About Running Out of Food in the Last Year:     [address] in the Last Year:    Transportation Needs:     Lack of Transportation (Medical):      Lack of Transportation (Non-Medical):    Physical Activity:     Days of Exercise per Week:     Minutes of Exercise per Session:    Stress:     Feeling of Stress :    Social Connections:     Frequency of Communication with Friends and Family:     Frequency of Social Gatherings with Friends and Family:     Attends [employer] Services:     Active Member of Clubs or Organizations:     Attends Club or Organization Meetings:     Marital Status:    Intimate Partner Violence:     Fear of Current or Ex-Partner:     Emotionally Abused:     Physically Abused:     Sexually Abused:             Current Medications/Allergies     Current Outpatient Medications   Medication Sig Dispense Refill    lisdexamfetamine (Vyvanse) 20 mg capsule Take  by mouth daily.  diclofenac (VOLTAREN) 1 % gel Apply 2 g to affected area four (4) times daily. Use for up to 2 weeks 100 g 1    atorvastatin (LIPITOR) 40 mg tablet TAKE 1 TABLET BY MOUTH EVERY DAY 90 Tab 1    pregabalin (LYRICA) 75 mg capsule Take 75 mg by mouth two (2) times a day.  cholecalciferol, vitamin D3, (VITAMIN D3 PO) Take 1 Tab by mouth daily.  pantoprazole (PROTONIX) 20 mg tablet Take 20 mg by mouth daily.  TREXIMET  mg tablet TK 1 T PO D PRN  3    FEXOFENADINE HCL (ALLEGRA PO) Take 1 Tab by mouth daily as needed.       ARIPiprazole (ABILIFY) 10 mg tablet        Allergies   Allergen Reactions    Gluten Anaphylaxis    Sulfamethoprim Ds Anaphylaxis, Palpitations, Shortness of Breath and Swelling    Topamax [Topiramate] Anaphylaxis    Doxycycline Nausea and Vomiting and Swelling    Zomig [Zolmitriptan] Shortness of Breath

## 2021-07-22 LAB
ALBUMIN SERPL-MCNC: 4.3 G/DL (ref 3.5–5)
ALBUMIN/GLOB SERPL: 1.3 {RATIO} (ref 1.1–2.2)
ALP SERPL-CCNC: 93 U/L (ref 45–117)
ALT SERPL-CCNC: 84 U/L (ref 12–78)
ANION GAP SERPL CALC-SCNC: 7 MMOL/L (ref 5–15)
AST SERPL-CCNC: 25 U/L (ref 15–37)
BILIRUB SERPL-MCNC: 1.4 MG/DL (ref 0.2–1)
BUN SERPL-MCNC: 16 MG/DL (ref 6–20)
BUN/CREAT SERPL: 18 (ref 12–20)
CALCIUM SERPL-MCNC: 9.5 MG/DL (ref 8.5–10.1)
CHLORIDE SERPL-SCNC: 104 MMOL/L (ref 97–108)
CHOLEST SERPL-MCNC: 242 MG/DL
CO2 SERPL-SCNC: 27 MMOL/L (ref 21–32)
CREAT SERPL-MCNC: 0.87 MG/DL (ref 0.7–1.3)
FOLATE SERPL-MCNC: 2.8 NG/ML (ref 5–21)
GLOBULIN SER CALC-MCNC: 3.3 G/DL (ref 2–4)
GLUCOSE SERPL-MCNC: 100 MG/DL (ref 65–100)
HBV SURFACE AG SER QL: <0.1 INDEX
HBV SURFACE AG SER QL: NEGATIVE
HDLC SERPL-MCNC: 33 MG/DL
HDLC SERPL: 7.3 {RATIO} (ref 0–5)
HIV 1+2 AB+HIV1 P24 AG SERPL QL IA: NONREACTIVE
HIV12 RESULT COMMENT, HHIVC: NORMAL
LDLC SERPL CALC-MCNC: 149.4 MG/DL (ref 0–100)
POTASSIUM SERPL-SCNC: 4 MMOL/L (ref 3.5–5.1)
PROT SERPL-MCNC: 7.6 G/DL (ref 6.4–8.2)
RPR SER QL: NONREACTIVE
SODIUM SERPL-SCNC: 138 MMOL/L (ref 136–145)
TRIGL SERPL-MCNC: 298 MG/DL (ref ?–150)
VIT B12 SERPL-MCNC: 349 PG/ML (ref 193–986)
VLDLC SERPL CALC-MCNC: 59.6 MG/DL

## 2021-07-25 LAB
C TRACH RRNA SPEC QL NAA+PROBE: NEGATIVE
N GONORRHOEA RRNA SPEC QL NAA+PROBE: NEGATIVE
PLEASE NOTE:, 188601: NORMAL
SPECIMEN SOURCE: NORMAL

## 2021-07-27 ENCOUNTER — TELEPHONE (OUTPATIENT)
Dept: FAMILY MEDICINE CLINIC | Age: 21
End: 2021-07-27

## 2021-07-28 NOTE — TELEPHONE ENCOUNTER
Reviewed results with patient. Would benefit from higher intensity statin or addition of second lipid lowering agent. Will reach out to hepatology to ensure to contraindication before making adjustment.

## 2021-07-28 NOTE — TELEPHONE ENCOUNTER
Patient was seen on:  Office Visit  Open   7/21/2021  Tierra Francisco, Mark Jackson MD  Family Medicine Well adult exam +4 more  Dx Physical ; Referred by Maxime Pond MD  Reason for Visit     Close enc    Thanks  [facility]  [facility] Referral Coordinator

## 2021-08-04 LAB — SARS-COV-2, NAA: NOT DETECTED

## 2021-08-15 RX ORDER — ARIPIPRAZOLE 10 MG/1
TABLET ORAL
COMMUNITY
Start: 2021-06-30 | End: 2022-03-24 | Stop reason: ALTCHOICE

## 2021-09-09 ENCOUNTER — TELEPHONE (OUTPATIENT)
Dept: FAMILY MEDICINE CLINIC | Age: 21
End: 2021-09-09

## 2021-09-09 NOTE — TELEPHONE ENCOUNTER
appt sched  ----- Message from Antonino Duke sent at 9/9/2021  7:53 AM EDT -----  Regarding: Dr. Kaylie Carrillo  Appointment not available    Caller's first and last name and relationship to patient (if not the patient):      Best contact number:[phone]      Preferred date and time:today or Friday      Scheduled appointment date and time:      Reason for appointment:body aches and congestion      Details to clarify the request:Pt stated he has body aches and congestion and requested a virtual appt for today or Friday.       Antonino Duke

## 2021-09-10 ENCOUNTER — VIRTUAL VISIT (OUTPATIENT)
Dept: FAMILY MEDICINE CLINIC | Age: 21
End: 2021-09-10
Payer: COMMERCIAL

## 2021-09-10 DIAGNOSIS — J02.0 STREP PHARYNGITIS: Primary | ICD-10-CM

## 2021-09-10 PROCEDURE — 99214 OFFICE O/P EST MOD 30 MIN: CPT | Performed by: STUDENT IN AN ORGANIZED HEALTH CARE EDUCATION/TRAINING PROGRAM

## 2021-09-10 RX ORDER — PENICILLIN V POTASSIUM 500 MG/1
500 TABLET, FILM COATED ORAL 2 TIMES DAILY
Qty: 20 TABLET | Refills: 0 | Status: SHIPPED | OUTPATIENT
Start: 2021-09-10 | End: 2022-03-24 | Stop reason: ALTCHOICE

## 2021-09-10 NOTE — PROGRESS NOTES
Afshan Meyers  24 y.o. male  2000  [address] [facility]  [id number]   [address]:    Telemedicine Progress Note  Mahnaz Lara MD       Encounter Date and Time: September 10, 2021 at 10:05 AM    Consent: Afshan Meyers, who was seen by synchronous (real-time) audio-video technology, and/or his healthcare decision maker, is aware that this patient-initiated, Telehealth encounter on 9/10/2021 is a billable service, with coverage as determined by his insurance carrier. He is aware that he may receive a bill and has provided verbal consent to proceed: Yes. No chief complaint on file. History of Present Illness   Afshan Meyers is a 24 y.o. male was evaluated by synchronous (real-time) audio-video technology from home, through a secure patient portal.    Patient is a 19yo male with h/o fibromyalgia, IBS, fatty, liver, and diverticulosis who complains of sore throat, pain with swallowing, and fatigue for about 3 days. Had a fever of 101 yesterday, but no fever or chills today. Patient's dad saw some white patches in the back of throat last night. Endorses multiple episodes of strep throat, last time was a year ago. Did have one episode of mucousy emesis yesterday. Denies cough, SOB, nausea, abdominal pain, and chest pain. Covid vax x2 4/2021 and plans for booster in December. Review of Systems   Review of Systems   Constitutional: Positive for fever and malaise/fatigue. HENT: Positive for sore throat. Negative for congestion and ear pain. Eyes: Negative for redness. Respiratory: Negative for cough, shortness of breath and wheezing. Cardiovascular: Negative for chest pain. Gastrointestinal: Negative for abdominal pain and nausea. Musculoskeletal: Positive for myalgias. Neurological: Negative for dizziness and headaches.        Vitals/Objective:     General: alert, cooperative, no distress   Mouth: Lips and mucous membranes moist without visible lesions. Unable to visualize the back of throat through video   Mental  status: mental status: alert, oriented to person, place, and time, normal mood, behavior, speech, dress, motor activity, and thought processes   Resp: resp: normal effort and no respiratory distress   Neuro: neuro: no gross deficits   Skin: skin: no discoloration or lesions of concern on visible areas   Due to this being a TeleHealth evaluation, many elements of the physical examination are unable to be assessed. peni  Assessment and Plan:   Time-based coding, delete if not needed: I spent at least 15 minutes with this established patient, and >50% of the time was spent counseling and/or coordinating care regarding sore throat    1. Strep pharyngitis diagnosis supported by sore throat/dysphagia/fever/visible plaques on throat, absence of cough, and personal h/o recurrence  - penicillin v potassium (VEETID) 500 mg tablet; Take 1 Tablet by mouth two (2) times a day. Dispense: 20 Tablet; Refill: 0  - RTC if symptoms do not resolve with abx       Time spent in direct conversation with the patient to include medical condition(s) discussed, assessment and treatment plan:       We discussed the expected course, resolution and complications of the diagnosis(es) in detail. Medication risks, benefits, costs, interactions, and alternatives were discussed as indicated. I advised him to contact the office if his condition worsens, changes or fails to improve as anticipated. He expressed understanding with the diagnosis(es) and plan. Patient understands that this encounter was a temporary measure, and the importance of further follow up and examination was emphasized. Patient verbalized understanding. Electronically Signed: Helen Whitt MD    CPT Codes 73810-63582 for Established Patients may apply to this Telehealth Visit. POS code: 18.   Modifier GT    Charisma Rodrigez is a 24 y.o. male who was evaluated by an audio-video encounter for concerns as above. Patient identification was verified prior to start of the visit. A caregiver was present when appropriate. Due to this being a TeleHealth encounter (During MXKTH-13 public health emergency), evaluation of the following organ systems was limited: Vitals/Constitutional/EENT/Resp/CV/GI//MS/Neuro/Skin/Heme-Lymph-Imm. Pursuant to the emergency declaration under the [facility] waiver authority and the [employer] Act, this Virtual Visit was conducted, with patient's (and/or legal guardian's) consent, to reduce the patient's risk of exposure to COVID-19 and provide necessary medical care. Services were provided through a synchronous discussion virtually to substitute for in-person clinic visit. I was at home. The patient was at home. History   Patients past medical, surgical and family histories were reviewed and updated. Past Medical History:   Diagnosis Date    ADHD (attention deficit hyperactivity disorder)     Anemia     Anorexia 05/26/2021    Bipolar 1 disorder ([facility] [address] 05/26/2021    Calculus of kidney     2018    Fibromyalgia     GERD (gastroesophageal reflux disease)     Hypercholesteremia     Hypertension, essential 2/21/2018    IBS (irritable bowel syndrome)     Liver disease     non-alcholic fatty liver    Migraine     Psoriasis     Vitamin B deficiency      Past Surgical History:   Procedure Laterality Date    COLONOSCOPY N/A 5/9/2017    COLON[facility] performed by Mary Guerrero MD at [facility] HX HEENT      wisdom teeth     No family history on file.   Social History     Socioeconomic History    Marital status: SINGLE     Spouse name: Not on file    Number of children: Not on file    Years of education: Not on file    Highest education level: Not on file   Occupational History    Not on file   Tobacco Use    Smoking status: Former Smoker Quit date: 2020     Years since quittin.9    Smokeless tobacco: Never Used   Vaping Use    Vaping Use: Every day    Substances: Nicotine    Devices: Pre-filled pod   Substance and Sexual Activity    Alcohol use: Yes     Alcohol/week: 4.0 standard drinks     Types: 4 Shots of liquor per week     Comment: 7- 10 drinks weekly    Drug use: No    Sexual activity: Not Currently     Partners: Male   Other Topics Concern    Not on file   Social History Narrative    Not on file     Social Determinants of Health     Financial Resource Strain:     Difficulty of Paying Living Expenses:    Food Insecurity:     Worried About Running Out of Food in the Last Year:     [address] in the Last Year:    Transportation Needs:     Lack of Transportation (Medical):      Lack of Transportation (Non-Medical):    Physical Activity:     Days of Exercise per Week:     Minutes of Exercise per Session:    Stress:     Feeling of Stress :    Social Connections:     Frequency of Communication with Friends and Family:     Frequency of Social Gatherings with Friends and Family:     Attends Buddhist Services:     Active Member of Clubs or Organizations:     Attends Club or Organization Meetings:     Marital Status:    Intimate Partner Violence:     Fear of Current or Ex-Partner:     Emotionally Abused:     Physically Abused:     Sexually Abused:      Patient Active Problem List   Diagnosis Code    Body aches R52    Mild single current episode of major depressive disorder ([facility] [address] F32.0    Hypertension, essential I10    Fibromyalgia M79.7    IBS (irritable bowel syndrome) K58.9    Gluten intolerance K90.41    Hypercholesterolemia E78.00    Obesity, Class II, BMI 11-25.1 J58.2    Folic acid deficiency V33.8    Fatty liver K76.0    Diverticulosis K57.90    Dysuria R30.0    History of kidney stones Z87.442          Current Medications/Allergies   Medications and Allergies reviewed:    Current Outpatient Medications   Medication Sig Dispense Refill    ARIPiprazole (ABILIFY) 10 mg tablet       lisdexamfetamine (Vyvanse) 20 mg capsule Take  by mouth daily.  diclofenac (VOLTAREN) 1 % gel Apply 2 g to affected area four (4) times daily. Use for up to 2 weeks 100 g 1    atorvastatin (LIPITOR) 40 mg tablet TAKE 1 TABLET BY MOUTH EVERY DAY 90 Tab 1    pregabalin (LYRICA) 75 mg capsule Take 75 mg by mouth two (2) times a day.  cholecalciferol, vitamin D3, (VITAMIN D3 PO) Take 1 Tab by mouth daily.  pantoprazole (PROTONIX) 20 mg tablet Take 20 mg by mouth daily.  TREXIMET  mg tablet TK 1 T PO D PRN  3    FEXOFENADINE HCL (ALLEGRA PO) Take 1 Tab by mouth daily as needed.        Allergies   Allergen Reactions    Gluten Anaphylaxis    Sulfamethoprim Ds Anaphylaxis, Palpitations, Shortness of Breath and Swelling    Topamax [Topiramate] Anaphylaxis    Doxycycline Nausea and Vomiting and Swelling    Zomig [Zolmitriptan] Shortness of Breath

## 2021-09-10 NOTE — PROGRESS NOTES
[address] Medicine Residency Attending Addendum:  Dr. Rajinder Henderson MD,  the patient and I were not physically present during this encounter. The resident and I are concurrently monitoring the patient care through appropriate telecommunication technology. I discussed the findings, assessment and plan with the resident and agree with the resident's findings and plan as documented in the resident's note.       Sailaja Guillen MD

## 2021-10-08 DIAGNOSIS — Z87.442 HISTORY OF KIDNEY STONES: ICD-10-CM

## 2021-10-10 RX ORDER — NAPROXEN 500 MG/1
TABLET ORAL
Qty: 10 TABLET | Refills: 0 | Status: SHIPPED | OUTPATIENT
Start: 2021-10-10 | End: 2021-10-19

## 2021-10-16 DIAGNOSIS — Z87.442 HISTORY OF KIDNEY STONES: ICD-10-CM

## 2021-10-19 RX ORDER — NAPROXEN 500 MG/1
TABLET ORAL
Qty: 10 TABLET | Refills: 0 | Status: SHIPPED | OUTPATIENT
Start: 2021-10-19 | End: 2021-10-31

## 2021-10-27 DIAGNOSIS — Z87.442 HISTORY OF KIDNEY STONES: ICD-10-CM

## 2021-10-31 RX ORDER — NAPROXEN 500 MG/1
TABLET ORAL
Qty: 10 TABLET | Refills: 0 | Status: SHIPPED | OUTPATIENT
Start: 2021-10-31

## 2021-12-10 DIAGNOSIS — E53.8 FOLATE DEFICIENCY: Primary | ICD-10-CM

## 2021-12-10 DIAGNOSIS — K76.0 NAFLD (NONALCOHOLIC FATTY LIVER DISEASE): ICD-10-CM

## 2021-12-10 DIAGNOSIS — E78.2 MIXED HYPERLIPIDEMIA: ICD-10-CM

## 2022-01-07 DIAGNOSIS — E78.00 HYPERCHOLESTEROLEMIA: ICD-10-CM

## 2022-01-11 RX ORDER — ATORVASTATIN CALCIUM 40 MG/1
TABLET, FILM COATED ORAL
Qty: 90 TABLET | Refills: 1 | Status: SHIPPED | OUTPATIENT
Start: 2022-01-11 | End: 2022-10-19

## 2022-03-08 ENCOUNTER — NURSE TRIAGE (OUTPATIENT)
Dept: OTHER | Facility: CLINIC | Age: 22
End: 2022-03-08

## 2022-03-08 ENCOUNTER — HOSPITAL ENCOUNTER (EMERGENCY)
Age: 22
Discharge: HOME | End: 2022-03-08
Attending: STUDENT IN AN ORGANIZED HEALTH CARE EDUCATION/TRAINING PROGRAM
Payer: COMMERCIAL

## 2022-03-08 VITALS
TEMPERATURE: 97.5 F | BODY MASS INDEX: 36.96 KG/M2 | RESPIRATION RATE: 16 BRPM | HEIGHT: 66 IN | OXYGEN SATURATION: 97 % | WEIGHT: 230 LBS | DIASTOLIC BLOOD PRESSURE: 87 MMHG | HEART RATE: 97 BPM | SYSTOLIC BLOOD PRESSURE: 131 MMHG

## 2022-03-08 DIAGNOSIS — L02.91 ABSCESS: Primary | ICD-10-CM

## 2022-03-08 PROCEDURE — 93005 ELECTROCARDIOGRAM TRACING: CPT

## 2022-03-08 PROCEDURE — 99283 EMERGENCY DEPT VISIT LOW MDM: CPT

## 2022-03-08 PROCEDURE — 75810000289 HC I&D ABSCESS SIMP/COMP/MULT

## 2022-03-08 RX ORDER — CEPHALEXIN 500 MG/1
500 CAPSULE ORAL 4 TIMES DAILY
Qty: 28 CAPSULE | Refills: 0 | Status: SHIPPED | OUTPATIENT
Start: 2022-03-08 | End: 2022-03-15

## 2022-03-08 RX ORDER — LIDOCAINE HYDROCHLORIDE 10 MG/ML
5 INJECTION, SOLUTION EPIDURAL; INFILTRATION; INTRACAUDAL; PERINEURAL ONCE
Status: DISCONTINUED | OUTPATIENT
Start: 2022-03-08 | End: 2022-03-08 | Stop reason: HOSPADM

## 2022-03-08 NOTE — LETTER
NOTIFICATION RETURN TO WORK / SCHOOL    3/8/2022 5:23 PM    Mr. [id number]      To Whom It May Concern:    Claudia Wilson is currently under the care of [facility] EMERGENCY DEPT. He will return to work/school on 3/9/22. If there are questions or concerns please have the patient contact our office.         Sincerely,      Kristen Quintero MD

## 2022-03-08 NOTE — ED PROVIDER NOTES
21 yo M w/ PMHx of HTN, HLD, GERD, ADHD who presents to the ED w/ a painful area of swelling involving his L chest as well as L-sided chest pressure. Pt notes that he first noticed a pimple around his L areola in November. The area became progressively larger and more painful. Over the past two weeks, the pain has significantly worsened. Additionally, pt has developed L-sided chest pressure over the past week. The pain has been fluctuating in intensity, but since this morning, the pain has been constant. The pain is not worse w/ exertion. Pt denies peripheral edema, nausea, vomiting, abdominal pain, dysuria, or hematuria. Past Medical History:   Diagnosis Date    ADHD (attention deficit hyperactivity disorder)     Anemia     Anorexia 2021    Bipolar 1 disorder ([address] 2021    Calculus of kidney         Fibromyalgia     GERD (gastroesophageal reflux disease)     Hypercholesteremia     Hypertension, essential 2018    IBS (irritable bowel syndrome)     Liver disease     non-alcholic fatty liver    Migraine     Psoriasis     Vitamin B deficiency        Past Surgical History:   Procedure Laterality Date    COLONOSCOPY N/A 2017    COLONOSCOPY performed by Alysha Pendleton MD at [address] [facility] HX HEENT      wisdom teeth         No family history on file. Social History     Socioeconomic History    Marital status: SINGLE     Spouse name: Not on file    Number of children: Not on file    Years of education: Not on file    Highest education level: Not on file   Occupational History    Not on file   Tobacco Use    Smoking status: Former Smoker     Quit date: 2020     Years since quittin.4    Smokeless tobacco: Never Used   Vaping Use    Vaping Use: Every day    Substances: Nicotine    Devices: Pre-filled pod   Substance and Sexual Activity    Alcohol use:  Yes     Alcohol/week: 4.0 standard drinks     Types: 4 Shots of liquor per week     Comment: 7- 10 drinks weekly    Drug use: No    Sexual activity: Not Currently     Partners: Male   Other Topics Concern    Not on file   Social History Narrative    Not on file     Social Determinants of Health     Financial Resource Strain:     Difficulty of Paying Living Expenses: Not on file   Food Insecurity:     Worried About Running Out of Food in the Last Year: Not on file    Emma of Food in the Last Year: Not on file   Transportation Needs:     Lack of Transportation (Medical): Not on file    Lack of Transportation (Non-Medical): Not on file   Physical Activity:     Days of Exercise per Week: Not on file    Minutes of Exercise per Session: Not on file   Stress:     Feeling of Stress : Not on file   Social Connections:     Frequency of Communication with Friends and Family: Not on file    Frequency of Social Gatherings with Friends and Family: Not on file    Attends Temple Services: Not on file    Active Member of [address] [place] [address] or Organizations: Not on file    Attends Club or Organization Meetings: Not on file    Marital Status: Not on file   Intimate Partner Violence:     Fear of Current or Ex-Partner: Not on file    Emotionally Abused: Not on file    Physically Abused: Not on file    Sexually Abused: Not on file   Housing Stability:     Unable to Pay for Housing in the Last Year: Not on file    Number of [place] in the Last Year: Not on file    Unstable Housing in the Last Year: Not on file         ALLERGIES: Gluten, Sulfamethoprim ds, Topamax [topiramate], Doxycycline, and Zomig [zolmitriptan]    Review of Systems   Constitutional: Negative for chills and fever. HENT: Negative for congestion and rhinorrhea. Eyes: Negative for visual disturbance. Respiratory: Negative for cough. Cardiovascular: Positive for chest pain. Gastrointestinal: Negative for abdominal pain. Genitourinary: Negative for dysuria and hematuria. Musculoskeletal: Negative for arthralgias.    Neurological: Negative for weakness and numbness. Psychiatric/Behavioral: Negative for agitation and behavioral problems. Vitals:    03/08/22 1520   BP: 131/87   Pulse: 97   Resp: 16   Temp: 97.5 °F (36.4 °C)   SpO2: 97%   Weight: 104.3 kg (230 lb)   Height: 5' 6\" (1.676 m)            Physical Examination:  General: No acute distress  Head: Normocephalic, atraumatic  Eyes: Conjunctiva pink  Neck: Supple  Chest wall: 1 cm diameter circular area of erythema and fluctuance involving the superior lateral quadrant of the L areola. The area is tender to palpation. CV: Heart: regular rate and rhythm  Resp: Lungs: clear to auscultation bilaterally  Extremities: No lower extremity edema  Skin: Warm, dry  Neuro: Awake, alert, and oriented. MDM  Number of Diagnoses or Management Options  Abscess  Diagnosis management comments: 19 yo M w/ PMHx of HTN, HLD, GERD, ADHD presents to the ED w/ L-sided chest pain and area of erythema and fluctuance involving L areola. EKG re-assuring. Bedside US demonstrates abscess underlying L areolar margin. Afebrile, VS stable. Will perform I&D. Pt tolerated I&D well (see procedure note below). Will treat with Keflex for seven days. Recommended follow-up w/ PCP. Pt discharged home in stable condition. ED Course as of 03/08/22 1722   Tue Mar 08, 2022   1528 EKG interpretation:   Rhythm: normal sinus rhythm; and regular . Rate (approx.): 99; Axis: normal; Intervals: normal ; ST/T wave: Nonspecific T wave; EKG documented and interpreted by Alma Kamara. Francie Patel MD, Emergency Medicine.     [AL]      ED Course User Index  [AL] Jenifer Barrow MD       I&D Abcess Simple    Date/Time: 3/8/2022 5:15 PM  Performed by: Fernie Palomo MD  Authorized by: Fernie Palomo MD     Consent:     Consent obtained:  Verbal    Consent given by:  Patient    Risks discussed:  Bleeding and pain  Location:     Type:  Abscess    Location: Left areolar margin.   Pre-procedure details:     Procedure prep: Alcohol wipes. Anesthesia (see MAR for exact dosages): Anesthesia method:  Local infiltration    Local anesthetic:  Lidocaine 1% w/o epi  Procedure type:     Complexity:  Simple  Procedure details:     Incision types:  Stab incision    Incision depth:  Subcutaneous    Scalpel blade:  15    Wound management:  Probed and deloculated    Drainage:  Bloody    Drainage amount:  Scant    Wound treatment:  Wound left open    Packing materials:  None  Post-procedure details:     Patient tolerance of procedure: Tolerated well, no immediate complications        Pt seen and discussed with Dr. Sloan Medina (ER attending physician). I&D procedure performed by Dr. Sloan Medina. Derick Jaeger MD  Family Medicine Resident, PGY-1        I personally saw and examined the patient. I have reviewed and agree with the residents findings, including all diagnostic interpretations, and plans as written. I was present during the key portions of separately billed procedures.   Colleen Panda MD

## 2022-03-08 NOTE — ED TRIAGE NOTES
Pt has had a lump on his left nipple since November and started growing a lot in the last week or two. Chest pain started a week ago. Denies SOB.

## 2022-03-08 NOTE — TELEPHONE ENCOUNTER
Received call from Lupe Viveros at [facility] with Red Flag Complaint. Subjective: Caller states \"It (mass) has been growing for 3-4 months. It started to look like a pimple and then got bigger. Now it is bigger and extremely painful to the touch. It radiates pain to the chest. It is red. \"     Current Symptoms: drainage from site 1 time 1 month ago, clear. Raised bump on skin. Onset: 4 months ago; worsening    Associated Symptoms: no head on top. smooth. redness. Pain Severity: 8/10; stabbing and tightness; was intermittent and now constant. Worse when anything brushes against abscess    Temperature: denies     What has been tried: Tylenol, Ibuprofen, Aleve. No topical ointment    LMP: NA Pregnant: NA    Recommended disposition: Go to [facility]. Pt instructed if unable to get appt to go to urgent care or walk in clinic. Agreeable. Care advice provided, patient verbalizes understanding; denies any other questions or concerns; instructed to call back for any new or worsening symptoms. Patient/Caller agrees with recommended disposition; writer provided warm transfer to [facility] at [facility] for appointment scheduling    Attention Provider: Thank you for allowing me to participate in the care of your patient. The patient was connected to triage in response to information provided to the [facility]. Please do not respond through this encounter as the response is not directed to a shared pool.       Reason for Disposition   SEVERE pain (e.g., excruciating)    Protocols used: BOIL (SKIN ABSCESS)-ADULT-OH

## 2022-03-10 LAB
ATRIAL RATE: 99 BPM
CALCULATED P AXIS, ECG09: 52 DEGREES
CALCULATED R AXIS, ECG10: 21 DEGREES
CALCULATED T AXIS, ECG11: 28 DEGREES
DIAGNOSIS, 93000: NORMAL
P-R INTERVAL, ECG05: 154 MS
Q-T INTERVAL, ECG07: 348 MS
QRS DURATION, ECG06: 88 MS
QTC CALCULATION (BEZET), ECG08: 446 MS
VENTRICULAR RATE, ECG03: 99 BPM

## 2022-03-18 PROBLEM — K57.90 DIVERTICULOSIS: Status: ACTIVE | Noted: 2020-11-11

## 2022-03-18 PROBLEM — M79.7 FIBROMYALGIA: Status: ACTIVE | Noted: 2018-02-21

## 2022-03-19 PROBLEM — K76.0 FATTY LIVER: Status: ACTIVE | Noted: 2020-11-11

## 2022-03-19 PROBLEM — Z87.442 HISTORY OF KIDNEY STONES: Status: ACTIVE | Noted: 2020-12-30

## 2022-03-19 PROBLEM — F32.0 MILD SINGLE CURRENT EPISODE OF MAJOR DEPRESSIVE DISORDER (HCC): Status: ACTIVE | Noted: 2017-05-18

## 2022-03-19 PROBLEM — I10 HYPERTENSION, ESSENTIAL: Status: ACTIVE | Noted: 2018-02-21

## 2022-03-19 PROBLEM — K90.41 GLUTEN INTOLERANCE: Status: ACTIVE | Noted: 2019-07-10

## 2022-03-19 PROBLEM — E53.8 FOLIC ACID DEFICIENCY: Status: ACTIVE | Noted: 2020-11-11

## 2022-03-19 PROBLEM — E66.9 OBESITY, CLASS II, BMI 35-39.9: Status: ACTIVE | Noted: 2020-11-06

## 2022-03-19 PROBLEM — E66.812 OBESITY, CLASS II, BMI 35-39.9: Status: ACTIVE | Noted: 2020-11-06

## 2022-03-19 PROBLEM — R52 BODY ACHES: Status: ACTIVE | Noted: 2017-05-18

## 2022-03-19 PROBLEM — R30.0 DYSURIA: Status: ACTIVE | Noted: 2020-12-30

## 2022-03-20 PROBLEM — E78.00 HYPERCHOLESTEROLEMIA: Status: ACTIVE | Noted: 2020-11-06

## 2022-03-24 ENCOUNTER — OFFICE VISIT (OUTPATIENT)
Dept: FAMILY MEDICINE CLINIC | Age: 22
End: 2022-03-24
Payer: COMMERCIAL

## 2022-03-24 VITALS
HEART RATE: 92 BPM | RESPIRATION RATE: 16 BRPM | BODY MASS INDEX: 39.71 KG/M2 | WEIGHT: 246 LBS | OXYGEN SATURATION: 98 % | DIASTOLIC BLOOD PRESSURE: 82 MMHG | SYSTOLIC BLOOD PRESSURE: 136 MMHG

## 2022-03-24 DIAGNOSIS — L02.91 ABSCESS: ICD-10-CM

## 2022-03-24 DIAGNOSIS — T78.49XA ALLERGIC REACTION TO COVID-19 VACCINE: Primary | ICD-10-CM

## 2022-03-24 PROBLEM — J30.9 ALLERGIC RHINITIS: Status: ACTIVE | Noted: 2022-03-24

## 2022-03-24 PROBLEM — H52.00 HYPEROPIA: Status: ACTIVE | Noted: 2022-03-24

## 2022-03-24 PROBLEM — S52.023A CLOSED FRACTURE OF OLECRANON PROCESS OF ULNA: Status: ACTIVE | Noted: 2022-03-24

## 2022-03-24 PROBLEM — H50.00 ESOTROPIA: Status: ACTIVE | Noted: 2022-03-24

## 2022-03-24 PROBLEM — E78.01 FAMILIAL HYPERCHOLESTEROLEMIA: Status: ACTIVE | Noted: 2022-03-24

## 2022-03-24 PROCEDURE — 99213 OFFICE O/P EST LOW 20 MIN: CPT | Performed by: STUDENT IN AN ORGANIZED HEALTH CARE EDUCATION/TRAINING PROGRAM

## 2022-03-24 RX ORDER — BUSPIRONE HYDROCHLORIDE 7.5 MG/1
7.5 TABLET ORAL 2 TIMES DAILY
COMMUNITY
Start: 2022-03-20

## 2022-03-24 RX ORDER — LURASIDONE HYDROCHLORIDE 20 MG/1
TABLET, FILM COATED ORAL
COMMUNITY
Start: 2022-03-21

## 2022-03-24 NOTE — PATIENT INSTRUCTIONS
Anaphylactic Reaction: Care Instructions  Your Care Instructions     A bad allergic reaction affects your whole body. Doctors call it an anaphylactic reaction. Your immune system may have reacted to food or medicine. Or maybe you had an insect bite or sting. This kind of reaction can happen the first time you come into contact with a substance. Or it may take many times before a substance causes a problem. You need to get help right away if your body reacts like this again. Follow-up care is a key part of your treatment and safety. Be sure to make and go to all appointments, and call your doctor if you are having problems. It's also a good idea to know your test results and keep a list of the medicines you take. How can you care for yourself at home? · If your doctor has prescribed medicine, such as an antihistamine, take it exactly as directed. Call your doctor if you think you are having a problem with your medicine. · Learn all you can about your allergies. You may be able to avoid a severe response when you do or don't do certain things. For instance, you can check food or drug labels for contents that might cause problems. · Your doctor may prescribe a shot of epinephrine to carry with you in case you have a severe reaction. Learn how to give yourself the shot. Keep it with you at all times. Make sure it has not . · Wear medical alert jewelry that lists your allergies. You can buy this at most drugstores. · Teach your family and friends about your allergies. Tell them what you need to avoid. Teach them what to do if you have a reaction. · Before you take any medicine, tell your doctor if you have had a bad response to any medicines in the past.  When should you call for help? Give an epinephrine shot if:    · You think you are having a severe allergic reaction. After giving an epinephrine shot call 911, even if you feel better.   Call 911 if:    · You have symptoms of a severe allergic reaction. These may include:  ? Sudden raised, red areas (hives) all over your body. ? Swelling of the throat, mouth, lips, or tongue. ? Trouble breathing. ? Passing out (losing consciousness). Or you may feel very lightheaded or suddenly feel weak, confused, or restless.     · You have been given an epinephrine shot, even if you feel better. Call your doctor now or seek immediate medical care if:    · You have symptoms of an allergic reaction, such as:  ? A rash or hives (raised, red areas on the skin). ? Itching. ? Swelling. ? Belly pain, nausea, or vomiting. Watch closely for changes in your health, and be sure to contact your doctor if:    · You do not get better as expected. Where can you learn more? Go to http://www.gray.com/  Enter E186 in the search box to learn more about \"Anaphylactic Reaction: Care Instructions. \"  Current as of: February 10, 2021               Content Version: 13.2  © 2006-2022 [employer]. Care instructions adapted under license by [employer] (which disclaims liability or warranty for this information). If you have questions about a medical condition or this instruction, always ask your healthcare professional. [place] [address] any warranty or liability for your use of this information. COVID-19 Vaccine: Care Instructions  Overview     The COVID-19 vaccine can help you avoid getting COVID-19, a disease caused by a type of coronavirus. COVID-19 can cause pneumonia and even death. You may need 1 or 2 doses of the vaccine. And you might need \"booster\" doses later to help you stay protected. It takes two weeks after your last dose to be fully protected from COVID-19. The vaccine prevents most cases of COVID-19. But if you do still catch COVID-19, your symptoms will probably be less severe than if you hadn't gotten the vaccine. You can't get COVID-19 from the vaccine.   What are the side effects of the COVID-19 vaccine? You might not have side effects. But if you do, they'll probably be like those of other vaccines, including:  · Fever. · Soreness. · Feeling very tired. This is normal. Your body is building protection against COVID-19. You may also have other side effects, including:  · Chills. · Headache. · Pain, redness, a rash, or swelling in the arm where you had the vaccine. · Swollen lymph nodes in the armpit of the arm where you had the vaccine. · Nausea. Side effects will likely go away in a few days. Until then, it may be harder to do your usual activities. If you think you've been exposed to COVID-19 or have symptoms like a cough, trouble breathing, or a new loss of smell or taste, call your doctor. These aren't vaccine side effects. You need a COVID-19 test.  Follow-up care is a key part of your treatment and safety. Be sure to make and go to all appointments, and call your doctor if you are having problems. It's also a good idea to know your test results and keep a list of the medicines you take. How can you care for yourself at home? · If you have a sore arm or a fever after getting the COVID-19 vaccine, you can take an over-the-counter pain medicine, such as acetaminophen (Tylenol) or ibuprofen (Advil, Motrin). Read and follow all instructions on the label. Do not give aspirin to anyone younger than 20. It has been linked to Reye syndrome, a serious illness. · Put ice or a cold pack on the sore area for 10 to 20 minutes at a time. Put a thin cloth between the ice and your skin. · If you have side effects, such as a fever, be sure to get enough rest and drink plenty of fluids. When should you call for help? Call 911  anytime you think you may need emergency care. For example, call if after getting the COVID-19 vaccine:    · You have symptoms of a severe reaction to the vaccine. Symptoms of a severe reaction may include:  ? Severe difficulty breathing.   ? Sudden raised, red areas (hives) all over your body. ? Severe lightheadedness. Call your doctor now or seek immediate medical care if:    · You have one or more of these symptoms within several weeks of getting a COVID-19 vaccine. ? Severe headache that does not go away. ? Shortness of breath. ? Chest pain, or a feeling of a fast-beating, fluttering, or pounding heart. ? Weakness or tingling sensations, especially in the legs or arms. ? Problems walking. ? Problems speaking, chewing, or swallowing. ? Blurred or double vision or other problems with your eyes. ? Losing bladder control or having bowel problems. ? Abdominal pain that does not go away. ? Pain, redness, or swelling in the leg.  ? Bruising or tiny spots under the skin that's not near where you got the vaccine. Watch closely for changes in your health, and be sure to contact your doctor if you have any problems. Where can you learn more? Go to http://www.gray.com/  Enter C126 in the search box to learn more about \"COVID-19 Vaccine: Care Instructions. \"  Current as of: July 1, 2021               Content Version: 13.2  © 0883-2393 [employer]. Care instructions adapted under license by [employer] (which disclaims liability or warranty for this information). If you have questions about a medical condition or this instruction, always ask your healthcare professional. [place] [address] any warranty or liability for your use of this information.

## 2022-03-24 NOTE — PROGRESS NOTES
Subjective  Karina Amaya is an 20 y.o. male who presents for incision check    Abscess drained in ER on 3/08  -Left open  -Prescribed cephalexin x 7 days  -now fine, no concerns    #anaphylaxis to covid shot  - premedicated for second shot, still some throat closing -   - had covid in January   - would like to get the booster shot  - only needed benadryl both times      Allergies - reviewed: Allergies   Allergen Reactions    Gluten Anaphylaxis    Sulfamethoprim Ds Anaphylaxis, Palpitations, Shortness of Breath and Swelling    Topamax [Topiramate] Anaphylaxis    Doxycycline Nausea and Vomiting and Swelling    Zomig [Zolmitriptan] Shortness of Breath         Medications - reviewed:   Current Outpatient Medications   Medication Sig    Latuda 20 mg tab tablet TAKE 1 TABLET BY MOUTH AT NIGHT    busPIRone (BUSPAR) 7.5 mg tablet Take 7.5 mg by mouth two (2) times a day.  atorvastatin (LIPITOR) 40 mg tablet TAKE 1 TABLET BY MOUTH EVERY DAY    naproxen (NAPROSYN) 500 mg tablet TAKE 1 TABLET BY MOUTH TWICE DAILY WITH MEALS FOR 5 DAYS    lisdexamfetamine (Vyvanse) 20 mg capsule Take  by mouth daily.  pregabalin (LYRICA) 75 mg capsule Take 75 mg by mouth two (2) times a day.  cholecalciferol, vitamin D3, (VITAMIN D3 PO) Take 1 Tab by mouth daily.  pantoprazole (PROTONIX) 20 mg tablet Take 20 mg by mouth daily.  TREXIMET  mg tablet TK 1 T PO D PRN    FEXOFENADINE HCL (ALLEGRA PO) Take 1 Tab by mouth daily as needed. No current facility-administered medications for this visit.          Past Medical History - reviewed:  Past Medical History:   Diagnosis Date    ADHD (attention deficit hyperactivity disorder)     Anemia     Anorexia 05/26/2021    Bipolar 1 disorder ([facility] [address] 05/26/2021    Calculus of kidney     2018    Fibromyalgia     GERD (gastroesophageal reflux disease)     Hypercholesteremia     Hypertension, essential 2/21/2018    IBS (irritable bowel syndrome)     Liver disease non-alcholic fatty liver    Migraine     Psoriasis     Vitamin B deficiency          Past Surgical History - reviewed:   Past Surgical History:   Procedure Laterality Date    COLONOSCOPY N/A 2017    COLONOSCOPY performed by Berry Elizondo MD at [address] [place] [address] HX HEENT      wisdom teeth         Social History - reviewed:  Social History     Socioeconomic History    Marital status: SINGLE     Spouse name: Not on file    Number of children: Not on file    Years of education: Not on file    Highest education level: Not on file   Occupational History    Not on file   Tobacco Use    Smoking status: Former Smoker     Quit date: 2020     Years since quittin.5    Smokeless tobacco: Never Used   Vaping Use    Vaping Use: Every day    Substances: Nicotine    Devices: Pre-filled pod   Substance and Sexual Activity    Alcohol use: Yes     Alcohol/week: 4.0 standard drinks     Types: 4 Shots of liquor per week     Comment: 7- 10 drinks weekly    Drug use: No    Sexual activity: Not Currently     Partners: Male   Other Topics Concern    Not on file   Social History Narrative    Not on file     Social Determinants of Health     Financial Resource Strain:     Difficulty of Paying Living Expenses: Not on file   Food Insecurity:     Worried About Running Out of Food in the Last Year: Not on file    Emma of Food in the Last Year: Not on file   Transportation Needs:     Lack of Transportation (Medical): Not on file    Lack of Transportation (Non-Medical):  Not on file   Physical Activity:     Days of Exercise per Week: Not on file    Minutes of Exercise per Session: Not on file   Stress:     Feeling of Stress : Not on file   Social Connections:     Frequency of Communication with Friends and Family: Not on file    Frequency of Social Gatherings with Friends and Family: Not on file    Attends Rastafarian Services: Not on file    Active Member of Clubs or Organizations: Not on file  Attends Club or Organization Meetings: Not on file    Marital Status: Not on file   Intimate Partner Violence:     Fear of Current or Ex-Partner: Not on file    Emotionally Abused: Not on file    Physically Abused: Not on file    Sexually Abused: Not on file   Housing Stability:     Unable to Pay for Housing in the Last Year: Not on file    Number of [place] in the Last Year: Not on file    Unstable Housing in the Last Year: Not on file         Family History - reviewed:  No family history on file. Immunizations - reviewed:   Immunization History   Administered Date(s) Administered    Hep A Vaccine 08/02/2006, 02/14/2007    Influenza Vaccine 10/11/2020       Review of Systems   Constitutional: Negative for chills, fever and weight loss. HENT: Negative for congestion and sore throat. Respiratory: Negative for cough. Cardiovascular: Negative for chest pain and palpitations. Neurological: Negative for dizziness and headaches. Physical Exam  Visit Vitals  /82 (BP 1 Location: Left upper arm, BP Patient Position: Sitting, BP Cuff Size: Adult)   Pulse 92   Resp 16   Wt 246 lb (111.6 kg)   SpO2 98%   BMI 39.71 kg/m²       General: No acute distress. HEENT: Conjunctiva are clear, sclera non-icteric. Respiratory: Normal work of breathing, talking in full sentences without issue  Musculoskeletal: Normal gait. Skin: No abnormalities or rashes   Neuro: Alert, oriented, speech clear and coherent  Psychiatric: Normal mood and affect        Assessment/Plan  1. Allergic reaction to COVID-19 vaccine  Pre-medicated with benadryl prior to second shot. Has had covid since vaccination. Would like to get booster but uncertain if this is wise. Not sure what element of the vaccine he reacts to. Has not seen allergist in some time, will send referral to consider skin testing vs. Recommendation of prophylactic regimen prior to booster.   - REFERRAL TO ALLERGY    2. Abscess  Healing well. Follow up PRN. Follow-up and Dispositions    · Return in about 3 months (around 6/24/2022) for Physical.           I have discussed the diagnosis with the patient and the intended plan as seen in the above orders. Patient verbalized understanding of the plan and agrees with the plan. The patient has received an after-visit summary and questions were answered concerning future plans. I have discussed medication side effects and warnings with the patient as well. Informed patient to return to the office if new symptoms arise.         Yadira Enriquez MD

## 2022-10-18 DIAGNOSIS — K76.0 FATTY LIVER: ICD-10-CM

## 2022-10-18 DIAGNOSIS — E78.00 HYPERCHOLESTEROLEMIA: ICD-10-CM

## 2022-10-18 DIAGNOSIS — E53.8 FOLIC ACID DEFICIENCY: Primary | ICD-10-CM

## 2022-10-18 DIAGNOSIS — I10 HYPERTENSION, ESSENTIAL: ICD-10-CM

## 2022-10-19 DIAGNOSIS — E53.8 FOLIC ACID DEFICIENCY: ICD-10-CM

## 2022-10-19 DIAGNOSIS — I10 HYPERTENSION, ESSENTIAL: ICD-10-CM

## 2022-10-19 DIAGNOSIS — K76.0 FATTY LIVER: ICD-10-CM

## 2022-10-19 DIAGNOSIS — E78.00 HYPERCHOLESTEROLEMIA: ICD-10-CM

## 2022-10-19 RX ORDER — ATORVASTATIN CALCIUM 40 MG/1
TABLET, FILM COATED ORAL
Qty: 90 TABLET | Refills: 0 | Status: SHIPPED | OUTPATIENT
Start: 2022-10-19

## 2023-04-18 ENCOUNTER — OFFICE VISIT (OUTPATIENT)
Dept: FAMILY MEDICINE CLINIC | Age: 23
End: 2023-04-18
Payer: OTHER GOVERNMENT

## 2023-04-18 VITALS
BODY MASS INDEX: 39.05 KG/M2 | RESPIRATION RATE: 18 BRPM | WEIGHT: 243 LBS | HEART RATE: 91 BPM | SYSTOLIC BLOOD PRESSURE: 134 MMHG | OXYGEN SATURATION: 96 % | TEMPERATURE: 97.8 F | HEIGHT: 66 IN | DIASTOLIC BLOOD PRESSURE: 85 MMHG

## 2023-04-18 DIAGNOSIS — K90.41 GLUTEN INTOLERANCE: ICD-10-CM

## 2023-04-18 DIAGNOSIS — E78.00 HYPERCHOLESTEROLEMIA: ICD-10-CM

## 2023-04-18 DIAGNOSIS — E53.8 FOLIC ACID DEFICIENCY: ICD-10-CM

## 2023-04-18 DIAGNOSIS — E66.9 OBESITY, CLASS II, BMI 35-39.9: ICD-10-CM

## 2023-04-18 DIAGNOSIS — K76.0 NAFLD (NONALCOHOLIC FATTY LIVER DISEASE): ICD-10-CM

## 2023-04-18 DIAGNOSIS — I10 HYPERTENSION, ESSENTIAL: Primary | ICD-10-CM

## 2023-04-18 PROBLEM — S52.023A CLOSED FRACTURE OF OLECRANON PROCESS OF ULNA: Status: RESOLVED | Noted: 2022-03-24 | Resolved: 2023-04-18

## 2023-04-18 PROBLEM — R30.0 DYSURIA: Status: RESOLVED | Noted: 2020-12-30 | Resolved: 2023-04-18

## 2023-04-18 LAB
INR PPP: 1 (ref 0.9–1.1)
PROTHROMBIN TIME: 10.9 SEC (ref 9–11.1)

## 2023-04-18 PROCEDURE — 3078F DIAST BP <80 MM HG: CPT | Performed by: FAMILY MEDICINE

## 2023-04-18 PROCEDURE — 3074F SYST BP LT 130 MM HG: CPT | Performed by: FAMILY MEDICINE

## 2023-04-18 PROCEDURE — 99214 OFFICE O/P EST MOD 30 MIN: CPT | Performed by: FAMILY MEDICINE

## 2023-04-18 NOTE — PROGRESS NOTES
Lise Blake  24 y.o. male  2000   [place] [address]  [id number]   [address]: Progress Note  No Jaramillo MD       Encounter Date: 4/18/2023    Chief Complaint   Patient presents with    Anxiety    Depression     History of Present Illness   Lise Blake is a 24 y.o. male who presents to clinic today for follow-up on his chronic conditions. Overall feels he is doing well. He is attempting to eat healthier while avoid prior restriction/purging habits. Lost his job earlier this year and is still looking for a more permanent position. Anxiety/Depression: Followed by Psychiatry. Feeling more manic lately, though does not feel acutely or overtly manic.     3 most recent PHQ Screens 4/18/2023   Little interest or pleasure in doing things Several days   Feeling down, depressed, irritable, or hopeless More than half the days   Total Score PHQ 2 3   Trouble falling or staying asleep, or sleeping too much More than half the days   Feeling tired or having little energy More than half the days   Poor appetite, weight loss, or overeating Nearly every day   Feeling bad about yourself - or that you are a failure or have let yourself or your family down More than half the days   Trouble concentrating on things such as school, work, reading, or watching TV Several days   Moving or speaking so slowly that other people could have noticed; or the opposite being so fidgety that others notice Not at all   Thoughts of being better off dead, or hurting yourself in some way Not at all   PHQ 9 Score 13   How difficult have these problems made it for you to do your work, take care of your home and get along with others Somewhat difficult     Review of Systems   Review of Systems   Constitutional:  Negative for chills and fever. HENT:  Negative for congestion and sore throat. Eyes:  Negative for blurred vision and double vision.    Respiratory:  Negative for cough, shortness of breath and wheezing. Cardiovascular:  Negative for chest pain and palpitations. Gastrointestinal:  Negative for abdominal pain, constipation, diarrhea and nausea. Genitourinary:  Negative for dysuria and hematuria. Musculoskeletal:  Negative for back pain, falls and myalgias. Skin:  Negative for rash. Neurological:  Negative for dizziness, tremors and headaches. Psychiatric/Behavioral:  Positive for depression. Negative for substance abuse and suicidal ideas. The patient is nervous/anxious. All other systems reviewed and are negative. Vitals/Objective:     Vitals:    04/18/23 1324   BP: 134/85   Pulse: 91   Resp: 18   Temp: 97.8 °F (36.6 °C)   TempSrc: Temporal   SpO2: 96%   Weight: 243 lb (110.2 kg)   Height: 5' 6\" (1.676 m)     Body mass index is 39.22 kg/m². General: Patient alert and oriented and in NAD  HEENT: PER/EOMI, no conjunctival pallor or scleral icterus. No thyromegaly or cervical lymphadenopathy  Heart: Regular rate and rhythm, No murmurs, rubs or gallops. 2+ peripheral pulses  Lungs: Clear to auscultation bilaterally, no wheezing, rales or rhonchi  Abd: +BS, non-tender, non-distended  Ext: No edema  Skin: No rashes or lesions noted on exposed skin,  Psych: Appropriate mood and affect    Assessment and Plan:   1. Hypertension, essential  BP at goal today. Currently diet controlled. Encouraged continued efforts for life style changes. 2. Folic acid deficiency  Will get updated levels. Folic acid added based on lab values. - VITAMIN H41 & FOLATE  - folic acid (FOLVITE) 102 mcg tablet; Take 1 Tablet by mouth daily. Dispense: 90 Tablet; Refill: 1    3. NAFLD (nonalcoholic fatty liver disease)  Will get updated labs. Added Fibrosure to evaluate for level of steatosis and fibrosis. Depending on results, we may consider imaging. Would avoid hepatotoxic medications as well as excessive EtOH use.    - PROTHROMBIN TIME + INR  - FOSTER FIBROSURE; Future  - HEMOGLOBIN A1C WITH EAG; Future    4. Gluten intolerance    5. Hypercholesterolemia  Getting updated lipid panel. Currently on 40mg daily. Will continue and make adjustments as needed based upon lipid panel. Key Antihyperlipidemia Meds               atorvastatin (LIPITOR) 40 mg tablet (Taking) TAKE 1 TABLET BY MOUTH EVERY DAY            6. Obesity, Class II, BMI 35-39.9  Hx of restriction and purging. Would avoid overly restrictive diets. Spent time today discussing the Hunger Scale to help bring self awareness to what his body is telling him and to help train his eating habits to be more in line with his hunger ques. Invited him to schedule follow-up appointments to specifically discuss next steps in weight loss prior to his next routine check-up. - HEMOGLOBIN A1C WITH EAG; Future      I have discussed the diagnosis with the patient and the intended plan as seen in the above orders. he has expressed understanding. The patient has received an after-visit summary and questions were answered concerning future plans. I have discussed medication side effects and warnings with the patient as well. Follow-up and Dispositions    Return in about 1 year (around 4/18/2024). Electronically Signed: Jose E Ko MD     History   Patients past medical, surgical and family histories were reviewed and updated.     Past Medical History:   Diagnosis Date    ADHD (attention deficit hyperactivity disorder)     Anemia     Anorexia 05/26/2021    Bipolar 1 disorder ([facility] [address] 05/26/2021    Calculus of kidney     2018    Closed fracture of olecranon process of ulna 3/24/2022    Fibromyalgia     GERD (gastroesophageal reflux disease)     Hypercholesteremia     Hypertension, essential 2/21/2018    IBS (irritable bowel syndrome)     Liver disease     non-alcholic fatty liver    Migraine     Psoriasis     Vitamin B deficiency      Past Surgical History:   Procedure Laterality Date    COLONOSCOPY N/A 5/9/2017 COL[facility] performed by [facility]el Frederick MD at [facility]    HX HEENT      wisdom teeth     No family history on file. Social History     Tobacco Use    Smoking status: Former     Types: Cigarettes     Quit date: 2020     Years since quittin.5    Smokeless tobacco: Never   Vaping Use    Vaping Use: Every day    Substances: Nicotine    Devices: Pre-filled pod   Substance Use Topics    Alcohol use: Yes     Alcohol/week: 4.0 standard drinks     Types: 4 Shots of liquor per week     Comment: 7- 10 drinks weekly    Drug use:  No              Current Medications/Allergies       Allergies   Allergen Reactions    Gluten Anaphylaxis    Sulfamethoprim Ds Anaphylaxis, Palpitations, Shortness of Breath and Swelling    Topamax [Topiramate] Anaphylaxis    Doxycycline Nausea and Vomiting and Swelling    Zomig [Zolmitriptan] Shortness of Breath

## 2023-04-18 NOTE — PROGRESS NOTES
Chief Complaint   Patient presents with    Anxiety    Depression     Visit Vitals  /85 (BP 1 Location: Right upper arm, BP Patient Position: Sitting, BP Cuff Size: Large adult)   Pulse 91   Temp 97.8 °F (36.6 °C) (Temporal)   Resp 18   Ht 5' 6\" (1.676 m)   Wt 243 lb (110.2 kg)   SpO2 96%   BMI 39.22 kg/m²     1. Have you been to the ER, urgent care clinic since your last visit? Hospitalized since your last visit? No    2. Have you seen or consulted any other health care providers outside of the [address] [place] [address] since your last visit? Include any pap smears or colon screening.  No

## 2023-04-19 LAB
ALBUMIN SERPL-MCNC: 4 G/DL (ref 3.5–5)
ALBUMIN/GLOB SERPL: 1.4 (ref 1.1–2.2)
ALP SERPL-CCNC: 82 U/L (ref 45–117)
ALT SERPL-CCNC: 103 U/L (ref 12–78)
ANION GAP SERPL CALC-SCNC: 4 MMOL/L (ref 5–15)
AST SERPL-CCNC: 34 U/L (ref 15–37)
BILIRUB SERPL-MCNC: 1.1 MG/DL (ref 0.2–1)
BUN SERPL-MCNC: 12 MG/DL (ref 6–20)
BUN/CREAT SERPL: 15 (ref 12–20)
CALCIUM SERPL-MCNC: 9.4 MG/DL (ref 8.5–10.1)
CHLORIDE SERPL-SCNC: 107 MMOL/L (ref 97–108)
CHOLEST SERPL-MCNC: 170 MG/DL
CO2 SERPL-SCNC: 25 MMOL/L (ref 21–32)
CREAT SERPL-MCNC: 0.82 MG/DL (ref 0.7–1.3)
ERYTHROCYTE [DISTWIDTH] IN BLOOD BY AUTOMATED COUNT: 12.7 % (ref 11.5–14.5)
FOLATE SERPL-MCNC: 3.7 NG/ML (ref 5–21)
GLOBULIN SER CALC-MCNC: 2.9 G/DL (ref 2–4)
GLUCOSE SERPL-MCNC: 157 MG/DL (ref 65–100)
HCT VFR BLD AUTO: 49.6 % (ref 36.6–50.3)
HDLC SERPL-MCNC: 28 MG/DL
HDLC SERPL: 6.1 (ref 0–5)
HGB BLD-MCNC: 16.6 G/DL (ref 12.1–17)
LDLC SERPL CALC-MCNC: 72 MG/DL (ref 0–100)
MCH RBC QN AUTO: 29.6 PG (ref 26–34)
MCHC RBC AUTO-ENTMCNC: 33.5 G/DL (ref 30–36.5)
MCV RBC AUTO: 88.6 FL (ref 80–99)
NRBC # BLD: 0 K/UL (ref 0–0.01)
NRBC BLD-RTO: 0 PER 100 WBC
PLATELET # BLD AUTO: 292 K/UL (ref 150–400)
PMV BLD AUTO: 11.2 FL (ref 8.9–12.9)
POTASSIUM SERPL-SCNC: 4 MMOL/L (ref 3.5–5.1)
PROT SERPL-MCNC: 6.9 G/DL (ref 6.4–8.2)
RBC # BLD AUTO: 5.6 M/UL (ref 4.1–5.7)
SODIUM SERPL-SCNC: 136 MMOL/L (ref 136–145)
TRIGL SERPL-MCNC: 350 MG/DL (ref ?–150)
VIT B12 SERPL-MCNC: 542 PG/ML (ref 193–986)
VLDLC SERPL CALC-MCNC: 70 MG/DL
WBC # BLD AUTO: 8.2 K/UL (ref 4.1–11.1)

## 2023-04-19 RX ORDER — UREA 10 %
800 LOTION (ML) TOPICAL DAILY
Qty: 90 TABLET | Refills: 1 | Status: SHIPPED | OUTPATIENT
Start: 2023-04-19

## 2023-04-20 ENCOUNTER — LAB ONLY (OUTPATIENT)
Dept: FAMILY MEDICINE CLINIC | Age: 23
End: 2023-04-20

## 2023-04-20 DIAGNOSIS — E66.9 OBESITY, CLASS II, BMI 35-39.9: ICD-10-CM

## 2023-04-20 DIAGNOSIS — K76.0 NAFLD (NONALCOHOLIC FATTY LIVER DISEASE): ICD-10-CM

## 2023-04-20 RX ORDER — ATORVASTATIN CALCIUM 40 MG/1
40 TABLET, FILM COATED ORAL DAILY
Qty: 90 TABLET | Refills: 3 | Status: SHIPPED | OUTPATIENT
Start: 2023-04-20

## 2023-04-20 NOTE — PROGRESS NOTES
Slight worsening of ALT (likely secondary to NAFLD). Getting FOSTER Fibrosure to evaluate for level of fibrosis and steatosis. INR normal.  Will also reflex a HgbA1c given elevated glucose. This lab draw was not fasting, but given liver dysfunction and metabolic syndrome, he is at higher risk of developing DM. HgbA1c will give a more long term idea of his blood sugar control.

## 2023-04-20 NOTE — PROGRESS NOTES
TG remains elevated, however other levels have shown improvement. Continue to work on lifestyle change.

## 2023-04-21 LAB
EST. AVERAGE GLUCOSE BLD GHB EST-MCNC: 117 MG/DL
HBA1C MFR BLD: 5.7 % (ref 4–5.6)

## 2023-04-22 LAB
A2 MACROGLOB SERPL-MCNC: 127 MG/DL (ref 110–276)
ALT SERPL W P-5'-P-CCNC: 76 IU/L (ref 0–55)
APO A-I SERPL-MCNC: 97 MG/DL (ref 101–178)
AST SERPL W P-5'-P-CCNC: 30 IU/L (ref 0–40)
BILIRUB SERPL-MCNC: 0.4 MG/DL (ref 0–1.2)
CHOLEST SERPL-MCNC: 193 MG/DL (ref 100–199)
FIBROSIS SCORING: ABNORMAL
FIBROSIS STAGE SERPL QL: ABNORMAL
GGT SERPL-CCNC: 77 IU/L (ref 0–65)
GLUCOSE SERPL-MCNC: 109 MG/DL (ref 70–99)
HAPTOGLOB SERPL-MCNC: 181 MG/DL (ref 17–317)
INTERPRETATIONS:, 550143: ABNORMAL
LABORATORY COMMENT REPORT: ABNORMAL
LIVER FIBR SCORE SERPL CALC.FIBROSURE: 0.1 (ref 0–0.21)
Lab: ABNORMAL
NASH SCORING, 550144: ABNORMAL
NECROINFLAMMATORY ACT GRADE SERPL QL: ABNORMAL
NECROINFLAMMATORY ACT SCORE SERPL: 0.5
SERVICE CMNT-IMP: ABNORMAL
STEATOSIS GRADE, 550153: ABNORMAL
STEATOSIS GRADING, 550189: ABNORMAL
STEATOSIS SCORE, 550149: 0.88 (ref 0–0.3)
TRIGL SERPL-MCNC: 361 MG/DL (ref 0–149)

## 2023-04-27 ENCOUNTER — HOSPITAL ENCOUNTER (EMERGENCY)
Age: 23
Discharge: HOME | End: 2023-04-28
Attending: STUDENT IN AN ORGANIZED HEALTH CARE EDUCATION/TRAINING PROGRAM
Payer: OTHER GOVERNMENT

## 2023-04-27 ENCOUNTER — APPOINTMENT (OUTPATIENT)
Dept: CT IMAGING | Age: 23
End: 2023-04-27
Attending: STUDENT IN AN ORGANIZED HEALTH CARE EDUCATION/TRAINING PROGRAM
Payer: OTHER GOVERNMENT

## 2023-04-27 DIAGNOSIS — S32.010A COMPRESSION FRACTURE OF L1 VERTEBRA, INITIAL ENCOUNTER (HCC): ICD-10-CM

## 2023-04-27 DIAGNOSIS — M54.50 ACUTE BILATERAL LOW BACK PAIN WITHOUT SCIATICA: ICD-10-CM
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
## 2023-04-27 DIAGNOSIS — S32.020A COMPRESSION FRACTURE OF L2 VERTEBRA, INITIAL ENCOUNTER (HCC): ICD-10-CM

## 2023-04-27 DIAGNOSIS — S32.030A COMPRESSION FRACTURE OF L3 VERTEBRA, INITIAL ENCOUNTER (HCC): ICD-10-CM

## 2023-04-27 PROCEDURE — 99284 EMERGENCY DEPT VISIT MOD MDM: CPT

## 2023-04-27 PROCEDURE — 74011250637 HC RX REV CODE- 250/637: Performed by: STUDENT IN AN ORGANIZED HEALTH CARE EDUCATION/TRAINING PROGRAM

## 2023-04-27 PROCEDURE — 72131 CT LUMBAR SPINE W/O DYE: CPT

## 2023-04-27 RX ORDER — ACETAMINOPHEN 500 MG
1000 TABLET ORAL
Qty: 60 TABLET | Refills: 0 | Status: SHIPPED | OUTPATIENT
Start: 2023-04-27

## 2023-04-27 RX ORDER — NAPROXEN 500 MG/1
500 TABLET ORAL 2 TIMES DAILY WITH MEALS
Qty: 20 TABLET | Refills: 0 | Status: SHIPPED | OUTPATIENT
Start: 2023-04-27 | End: 2023-04-27 | Stop reason: SDUPTHER

## 2023-04-27 RX ORDER — ACETAMINOPHEN 500 MG
1000 TABLET ORAL ONCE
Status: COMPLETED | OUTPATIENT
Start: 2023-04-27 | End: 2023-04-27

## 2023-04-27 RX ORDER — NAPROXEN 500 MG/1
500 TABLET ORAL 2 TIMES DAILY WITH MEALS
Qty: 20 TABLET | Refills: 0 | Status: SHIPPED | OUTPATIENT
Start: 2023-04-27 | End: 2023-05-07

## 2023-04-27 RX ORDER — CYCLOBENZAPRINE HCL 10 MG
10 TABLET ORAL
Qty: 30 TABLET | Refills: 0 | Status: SHIPPED | OUTPATIENT
Start: 2023-04-27 | End: 2023-05-07

## 2023-04-27 RX ORDER — NAPROXEN 250 MG/1
500 TABLET ORAL ONCE
Status: COMPLETED | OUTPATIENT
Start: 2023-04-27 | End: 2023-04-27

## 2023-04-27 RX ADMIN — NAPROXEN 500 MG: 250 TABLET ORAL at 22:59

## 2023-04-27 RX ADMIN — ACETAMINOPHEN 1000 MG: 500 TABLET ORAL at 22:59

## 2023-04-28 ENCOUNTER — APPOINTMENT (OUTPATIENT)
Dept: CT IMAGING | Age: 23
End: 2023-04-28
Attending: STUDENT IN AN ORGANIZED HEALTH CARE EDUCATION/TRAINING PROGRAM
Payer: OTHER GOVERNMENT

## 2023-04-28 VITALS
OXYGEN SATURATION: 95 % | BODY MASS INDEX: 40.35 KG/M2 | SYSTOLIC BLOOD PRESSURE: 147 MMHG | WEIGHT: 250 LBS | TEMPERATURE: 97.9 F | RESPIRATION RATE: 18 BRPM | DIASTOLIC BLOOD PRESSURE: 89 MMHG | HEART RATE: 98 BPM

## 2023-04-28 PROCEDURE — 70450 CT HEAD/BRAIN W/O DYE: CPT
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
## 2023-05-02 ENCOUNTER — OFFICE VISIT (OUTPATIENT)
Dept: FAMILY MEDICINE CLINIC | Age: 23
End: 2023-05-02

## 2023-05-02 VITALS
HEIGHT: 66 IN | BODY MASS INDEX: 38.89 KG/M2 | RESPIRATION RATE: 18 BRPM | OXYGEN SATURATION: 94 % | HEART RATE: 79 BPM | TEMPERATURE: 98.2 F | WEIGHT: 242 LBS | DIASTOLIC BLOOD PRESSURE: 76 MMHG | SYSTOLIC BLOOD PRESSURE: 127 MMHG

## 2023-05-02 DIAGNOSIS — S32.010A COMPRESSION FRACTURE OF L1 VERTEBRA, INITIAL ENCOUNTER (HCC): Primary | ICD-10-CM

## 2023-05-02 RX ORDER — OXYCODONE HYDROCHLORIDE 5 MG/1
5 TABLET ORAL
Qty: 9 TABLET | Refills: 0 | Status: SHIPPED | OUTPATIENT
Start: 2023-05-02 | End: 2023-05-05

## 2023-05-02 RX ORDER — LIDOCAINE 4 G/100G
PATCH TOPICAL
Qty: 15 EACH | Refills: 0 | Status: SHIPPED | OUTPATIENT
Start: 2023-05-02

## 2023-05-04 PROBLEM — S32.010A COMPRESSION FRACTURE OF L1 LUMBAR VERTEBRA (HCC): Status: ACTIVE | Noted: 2023-05-04

## 2023-07-05 ENCOUNTER — OFFICE VISIT (OUTPATIENT)
Age: 23
End: 2023-07-05
Payer: OTHER GOVERNMENT

## 2023-07-05 ENCOUNTER — NURSE TRIAGE (OUTPATIENT)
Dept: OTHER | Facility: CLINIC | Age: 23
End: 2023-07-05

## 2023-07-05 VITALS
SYSTOLIC BLOOD PRESSURE: 137 MMHG | BODY MASS INDEX: 39.31 KG/M2 | TEMPERATURE: 98.3 F | RESPIRATION RATE: 18 BRPM | HEIGHT: 66 IN | OXYGEN SATURATION: 96 % | HEART RATE: 109 BPM | WEIGHT: 244.6 LBS | DIASTOLIC BLOOD PRESSURE: 82 MMHG

## 2023-07-05 DIAGNOSIS — M79.644 FINGER PAIN, RIGHT: Primary | ICD-10-CM

## 2023-07-05 PROCEDURE — 99213 OFFICE O/P EST LOW 20 MIN: CPT

## 2023-07-05 RX ORDER — LURASIDONE HYDROCHLORIDE 40 MG/1
40 TABLET, FILM COATED ORAL NIGHTLY
COMMUNITY
Start: 2023-06-23

## 2023-07-05 RX ORDER — LISDEXAMFETAMINE DIMESYLATE 40 MG/1
1 CAPSULE ORAL DAILY
COMMUNITY
Start: 2023-05-17

## 2023-07-05 RX ORDER — ACETAMINOPHEN 500 MG
2 TABLET ORAL EVERY 6 HOURS PRN
COMMUNITY
Start: 2023-04-27

## 2023-07-05 RX ORDER — UREA 10 %
800 LOTION (ML) TOPICAL DAILY
COMMUNITY
Start: 2023-04-21 | End: 2023-07-05

## 2023-07-05 SDOH — ECONOMIC STABILITY: FOOD INSECURITY: WITHIN THE PAST 12 MONTHS, THE FOOD YOU BOUGHT JUST DIDN'T LAST AND YOU DIDN'T HAVE MONEY TO GET MORE.: NEVER TRUE

## 2023-07-05 SDOH — ECONOMIC STABILITY: INCOME INSECURITY: HOW HARD IS IT FOR YOU TO PAY FOR THE VERY BASICS LIKE FOOD, HOUSING, MEDICAL CARE, AND HEATING?: NOT HARD AT ALL

## 2023-07-05 SDOH — ECONOMIC STABILITY: HOUSING INSECURITY
IN THE LAST 12 MONTHS, WAS THERE A TIME WHEN YOU DID NOT HAVE A STEADY PLACE TO SLEEP OR SLEPT IN A SHELTER (INCLUDING NOW)?: NO

## 2023-07-05 SDOH — ECONOMIC STABILITY: FOOD INSECURITY: WITHIN THE PAST 12 MONTHS, YOU WORRIED THAT YOUR FOOD WOULD RUN OUT BEFORE YOU GOT MONEY TO BUY MORE.: NEVER TRUE

## 2023-07-05 ASSESSMENT — PATIENT HEALTH QUESTIONNAIRE - PHQ9
2. FEELING DOWN, DEPRESSED OR HOPELESS: 0
4. FEELING TIRED OR HAVING LITTLE ENERGY: 0
10. IF YOU CHECKED OFF ANY PROBLEMS, HOW DIFFICULT HAVE THESE PROBLEMS MADE IT FOR YOU TO DO YOUR WORK, TAKE CARE OF THINGS AT HOME, OR GET ALONG WITH OTHER PEOPLE: 0
6. FEELING BAD ABOUT YOURSELF - OR THAT YOU ARE A FAILURE OR HAVE LET YOURSELF OR YOUR FAMILY DOWN: 0
8. MOVING OR SPEAKING SO SLOWLY THAT OTHER PEOPLE COULD HAVE NOTICED. OR THE OPPOSITE, BEING SO FIGETY OR RESTLESS THAT YOU HAVE BEEN MOVING AROUND A LOT MORE THAN USUAL: 0
SUM OF ALL RESPONSES TO PHQ QUESTIONS 1-9: 0
9. THOUGHTS THAT YOU WOULD BE BETTER OFF DEAD, OR OF HURTING YOURSELF: 0
SUM OF ALL RESPONSES TO PHQ QUESTIONS 1-9: 0
7. TROUBLE CONCENTRATING ON THINGS, SUCH AS READING THE NEWSPAPER OR WATCHING TELEVISION: 0
SUM OF ALL RESPONSES TO PHQ9 QUESTIONS 1 & 2: 0
5. POOR APPETITE OR OVEREATING: 0
SUM OF ALL RESPONSES TO PHQ QUESTIONS 1-9: 0
3. TROUBLE FALLING OR STAYING ASLEEP: 0
SUM OF ALL RESPONSES TO PHQ QUESTIONS 1-9: 0
1. LITTLE INTEREST OR PLEASURE IN DOING THINGS: 0

## 2023-07-05 NOTE — PROGRESS NOTES
4-6 5124. Intake  [address], [address] [facility]   Office [phone], Fax [phone]    Subjective:     Chief Complaint   Patient presents with    Finger Pain     Right pointer, x 2 days, no injury, redness       HPI:  Humberto Barron is a 24 y.o. male with a history of reported fibromyalgia that presents for:    Finger Pain  2 days of pain over right pointer finger  Mostly over knuckle, does extend to DIP  Bending and straightening worsens   No trauma  Works with in a pizza shop  No fevers  Is now starting to feel some mild weakness in thumb  Nothing like this ever before   History of psoriasis, as well as strong family history. Dx at age 14  No history of gout, but family history   No recent alcohol  Does not increased red meat       Health Maintenance:  Health Maintenance Due   Topic Date Due    COVID-19 Vaccine (1) Never done    Pneumococcal 0-64 years Vaccine (1 - PCV) Never done    HPV vaccine (1 - Male 2-dose series) Never done    Varicella vaccine (1 of 2 - 2-dose childhood series) Never done    DTaP/Tdap/Td vaccine (1 - Tdap) Never done          Past Medical Hx  I personally reviewed. Past Medical History:   Diagnosis Date    ADHD (attention deficit hyperactivity disorder)     Anemia     Anorexia 05/26/2021    Bipolar 1 disorder ([address]) 05/26/2021    Calculus of kidney     2018    Closed fracture of olecranon process of ulna 3/24/2022    Fibromyalgia     GERD (gastroesophageal reflux disease)     Hypercholesteremia     Hypertension, essential 2/21/2018    IBS (irritable bowel syndrome)     Liver disease     non-alcholic fatty liver    Migraine     Psoriasis     Vitamin B deficiency         SocHx   I personally reviewed.   Social History     Socioeconomic History    Marital status: Single     Spouse name: Not on file    Number of children: Not on file    Years of education: Not on file    Highest education level: Not on file   Occupational History    Not on file   Tobacco Use    Smoking

## 2023-07-05 NOTE — TELEPHONE ENCOUNTER
Location of patient: [address] call from [facility] at [facility] with [employer]. Subjective: Caller states \"Hand pain\"     Current Symptoms: Pointer finger and thumb on the right is weak and hard to move. Painful  Swelling and redness starting in the hand    Onset:   Monday night    Pain Severity: 8/10    Temperature: Patient is reported to not have a fever. Also denies chills and sweats     What has been tried: Heat, ice, Tylenol    History related to the current reason for call: Fractured vertibrea    Recommended disposition: Go to Office Now    Care advice provided, patient verbalizes understanding; denies any other questions or concerns; instructed to call back for any new or worsening symptoms. Patient/Caller agrees with recommended disposition; writer provided warm transfer to [facility] at [facility] for appointment scheduling     Attention Provider: Thank you for allowing me to participate in the care of your patient. The patient was connected to triage in response to information provided to the ECC/PSC. Please do not respond through this encounter as the response is not directed to a shared pool.     Reason for Disposition   SEVERE pain (e.g., excruciating, unable to use hand at all)    Protocols used: Finger Pain-ADULT-[place]

## 2024-01-02 RX ORDER — UREA 10 %
800 LOTION (ML) TOPICAL DAILY
Qty: 90 TABLET | Refills: 1 | Status: SHIPPED | OUTPATIENT
Start: 2024-01-02

## 2024-02-08 ENCOUNTER — TELEMEDICINE (OUTPATIENT)
Age: 24
End: 2024-02-08
Payer: OTHER GOVERNMENT

## 2024-02-08 DIAGNOSIS — E78.00 PURE HYPERCHOLESTEROLEMIA, UNSPECIFIED: ICD-10-CM

## 2024-02-08 DIAGNOSIS — E66.09 CLASS 2 OBESITY DUE TO EXCESS CALORIES WITHOUT SERIOUS COMORBIDITY WITH BODY MASS INDEX (BMI) OF 39.0 TO 39.9 IN ADULT: ICD-10-CM

## 2024-02-08 DIAGNOSIS — R53.83 OTHER FATIGUE: ICD-10-CM

## 2024-02-08 DIAGNOSIS — I10 ESSENTIAL (PRIMARY) HYPERTENSION: ICD-10-CM

## 2024-02-08 DIAGNOSIS — U07.1 COVID: Primary | ICD-10-CM

## 2024-02-08 DIAGNOSIS — K76.0 FATTY (CHANGE OF) LIVER, NOT ELSEWHERE CLASSIFIED: ICD-10-CM

## 2024-02-08 PROCEDURE — 99442 PR PHYS/QHP TELEPHONE EVALUATION 11-20 MIN: CPT | Performed by: FAMILY MEDICINE

## 2024-02-08 NOTE — PROGRESS NOTES
Harley Brunson  24 y.o. male  2000  [address]  [place] [address]  [id number]    [phone] (home)      [facility]:    Telephone Encounter  Kandy Kellogg MD       Encounter Date: 2/8/2024 at 5:39 PM    Harley Brunson was evaluated through a patient-initiated, synchronous (real-time) audio only encounter. He (or guardian if applicable) is aware that it is a billable service, which includes applicable co-pays, with coverage as determined by his insurance carrier. This visit was conducted with the patient's (and/or legan guardian's) verbal consent. He has not had a related appointment within my department in the past 7 days or scheduled within the next 24 hours. The patient was located in a state where the provider was licensed to provide care.  The patient was located at: Home: [address]  [place] [facility]  The provider was located at: Facility (Appt Dept): [address] [place] [address]     Chief Complaint   Patient presents with    Positive For Covid-19       History of Present Illness   Harley Brunson is a 24 y.o. male was evaluated by telephone.   I communicated with the patient and/or health care decision maker about his COVID symptoms.  The patient started to have fever associated with nasal congestion 2 days ago with T max of 101.4F.  She took home COVID test and it came back positive yesterday.  This morning he woke up with some improvement of his symptoms.  His fever resolved but he has been having some fatigue.  No cough, no shortness of breath, no body aches.  He is vaccinated for COVID x 2 without booster.  Was diagnosed with COVID in the past has never received any treatment.      Review of Systems   Per HPI      Vitals/Objective:   General: Patient speaking in complete sentences without effort.  Normal speech and cooperative.       Due to this being a Virtual Check-in/Telephone evaluation, many elements of the physical

## 2024-06-11 DIAGNOSIS — R73.03 PREDIABETES: ICD-10-CM

## 2024-06-11 DIAGNOSIS — E66.9 OBESITY, CLASS II, BMI 35-39.9: ICD-10-CM

## 2024-06-11 DIAGNOSIS — I10 HYPERTENSION, ESSENTIAL: Primary | ICD-10-CM

## 2024-06-11 DIAGNOSIS — E53.8 FOLIC ACID DEFICIENCY: ICD-10-CM

## 2024-06-11 DIAGNOSIS — K76.0 NAFLD (NONALCOHOLIC FATTY LIVER DISEASE): ICD-10-CM

## 2024-06-12 RX ORDER — ATORVASTATIN CALCIUM 40 MG/1
TABLET, FILM COATED ORAL DAILY
Qty: 90 TABLET | OUTPATIENT
Start: 2024-06-12

## 2024-06-13 RX ORDER — ATORVASTATIN CALCIUM 40 MG/1
TABLET, FILM COATED ORAL DAILY
Qty: 90 TABLET | Refills: 0 | Status: SHIPPED | OUTPATIENT
Start: 2024-06-13

## 2024-09-05 NOTE — TELEPHONE ENCOUNTER
Medication Refill Request    Harley Nannette is requesting a refill of the following medication(s):   Requested Prescriptions     Pending Prescriptions Disp Refills    folic acid (FOLVITE) 800 MCG tablet [Pharmacy Med Name: FOLIC ACID 800MCG TABLETS] 90 tablet 1     Sig: TAKE 1 TABLET BY MOUTH DAILY        Listed PCP is Steven Ardon MD   Last provider to prescribe medication: Dr Ardon   Last Date of Medication Prescribed:  01/02/2024  Date of Last Office Visit at [facility]:  02/08/2024  FUTURE APPOINTMENT: No future appointments.    Please send refill to:    [id number] [place], [place] - [address] - P [phone] - F [phone] [address] [facility] [facility] [address]  Phone: [phone] Fax: [phone]      Please review request and approve or deny with recommendations.

## 2024-09-06 RX ORDER — FOLIC ACID 0.8 MG
800 TABLET ORAL DAILY
Qty: 90 TABLET | Refills: 1 | Status: SHIPPED | OUTPATIENT
Start: 2024-09-06

## 2024-10-04 DIAGNOSIS — E78.00 PURE HYPERCHOLESTEROLEMIA, UNSPECIFIED: Primary | ICD-10-CM

## 2024-10-07 RX ORDER — ATORVASTATIN CALCIUM 40 MG/1
40 TABLET, FILM COATED ORAL DAILY
Qty: 90 TABLET | Refills: 0 | Status: SHIPPED | OUTPATIENT
Start: 2024-10-07

## 2025-02-04 DIAGNOSIS — E78.00 PURE HYPERCHOLESTEROLEMIA, UNSPECIFIED: ICD-10-CM

## 2025-02-07 RX ORDER — ATORVASTATIN CALCIUM 40 MG/1
40 TABLET, FILM COATED ORAL DAILY
Qty: 90 TABLET | Refills: 0 | Status: SHIPPED | OUTPATIENT
Start: 2025-02-07

## 2025-03-07 ENCOUNTER — OFFICE VISIT (OUTPATIENT)
Age: 25
End: 2025-03-07

## 2025-03-07 VITALS
HEART RATE: 74 BPM | WEIGHT: 244 LBS | DIASTOLIC BLOOD PRESSURE: 86 MMHG | OXYGEN SATURATION: 94 % | HEIGHT: 66 IN | TEMPERATURE: 97.8 F | RESPIRATION RATE: 16 BRPM | BODY MASS INDEX: 39.21 KG/M2 | SYSTOLIC BLOOD PRESSURE: 138 MMHG

## 2025-03-07 DIAGNOSIS — S61.012A LACERATION OF LEFT THUMB WITHOUT FOREIGN BODY WITHOUT DAMAGE TO NAIL, INITIAL ENCOUNTER: Primary | ICD-10-CM

## 2025-03-07 RX ORDER — CEPHALEXIN 500 MG/1
500 CAPSULE ORAL 2 TIMES DAILY
Qty: 10 CAPSULE | Refills: 0 | Status: SHIPPED | OUTPATIENT
Start: 2025-03-07 | End: 2025-03-12

## 2025-03-07 NOTE — PROGRESS NOTES
questions were answered concerning future plans.  I have discussed medication side effects and warnings with the patient as well. The patient agrees and understands above plan.       An electronic signature was used to authenticate this note.  -- Beulah Alicea PA-C

## 2025-05-09 ENCOUNTER — TELEPHONE (OUTPATIENT)
Age: 25
End: 2025-05-09

## 2025-05-09 NOTE — TELEPHONE ENCOUNTER
Hi Dr. Ardon,    Patient called and stated that  he is sure he is passing a kidney stone and asked if you can send in something for pain. I did inform him that you would probably not be able to without being seen first. If you are not able to please let me know so I can inform patient.    Pharmacy    [id number] - [place], [place] - [address] - P [phone] - F [phone] [address] [facility] [address] [address]  Phone: [phone]  Fax: [phone]

## 2025-05-13 RX ORDER — FOLIC ACID 0.8 MG
800 TABLET ORAL DAILY
Qty: 90 TABLET | Refills: 1 | Status: SHIPPED | OUTPATIENT
Start: 2025-05-13

## 2025-05-13 NOTE — TELEPHONE ENCOUNTER
Medication Refill Request: Patient has appointment coming up with you Just MAREN     Harley Guzmánkoffi is requesting a refill of the following medication(s):   Requested Prescriptions     Pending Prescriptions Disp Refills    folic acid (FOLVITE) 800 MCG tablet [Pharmacy Med Name: FOLIC ACID 800MCG TABLETS] 90 tablet 1     Sig: TAKE 1 TABLET BY MOUTH DAILY        Listed PCP is Steven Ardon MD   Last provider to prescribe medication: Reva  Last Date of Medication Prescribed:  09/06/2024  Date of Last Office Visit at [facility]:  07/05/2023  FUTURE APPOINTMENT:   Future Appointments   Date Time Provider Department Center   5/14/2025  9:20 AM Steven Ardon MD [facility] DEP       Please send refill to:    [id number] [place], [place] - [address] - P [phone] - F [phone] [address] [facility] [address]  Phone: [phone] Fax: [phone]      Please review request and approve or deny with recommendations.

## 2025-05-14 SDOH — ECONOMIC STABILITY: FOOD INSECURITY: WITHIN THE PAST 12 MONTHS, YOU WORRIED THAT YOUR FOOD WOULD RUN OUT BEFORE YOU GOT MONEY TO BUY MORE.: NEVER TRUE

## 2025-05-14 SDOH — ECONOMIC STABILITY: TRANSPORTATION INSECURITY
IN THE PAST 12 MONTHS, HAS LACK OF TRANSPORTATION KEPT YOU FROM MEETINGS, WORK, OR FROM GETTING THINGS NEEDED FOR DAILY LIVING?: NO

## 2025-05-14 SDOH — ECONOMIC STABILITY: TRANSPORTATION INSECURITY
IN THE PAST 12 MONTHS, HAS THE LACK OF TRANSPORTATION KEPT YOU FROM MEDICAL APPOINTMENTS OR FROM GETTING MEDICATIONS?: NO

## 2025-05-14 SDOH — ECONOMIC STABILITY: FOOD INSECURITY: WITHIN THE PAST 12 MONTHS, THE FOOD YOU BOUGHT JUST DIDN'T LAST AND YOU DIDN'T HAVE MONEY TO GET MORE.: NEVER TRUE

## 2025-05-14 SDOH — ECONOMIC STABILITY: INCOME INSECURITY: IN THE LAST 12 MONTHS, WAS THERE A TIME WHEN YOU WERE NOT ABLE TO PAY THE MORTGAGE OR RENT ON TIME?: NO

## 2025-05-15 ENCOUNTER — RESULTS FOLLOW-UP (OUTPATIENT)
Age: 25
End: 2025-05-15

## 2025-05-15 ENCOUNTER — OFFICE VISIT (OUTPATIENT)
Age: 25
End: 2025-05-15
Payer: COMMERCIAL

## 2025-05-15 VITALS
RESPIRATION RATE: 18 BRPM | WEIGHT: 217.8 LBS | BODY MASS INDEX: 35 KG/M2 | HEIGHT: 66 IN | TEMPERATURE: 98.1 F | HEART RATE: 90 BPM | OXYGEN SATURATION: 95 % | DIASTOLIC BLOOD PRESSURE: 87 MMHG | SYSTOLIC BLOOD PRESSURE: 126 MMHG

## 2025-05-15 DIAGNOSIS — N20.0 NEPHROLITHIASIS: ICD-10-CM

## 2025-05-15 DIAGNOSIS — R10.9 ACUTE RIGHT FLANK PAIN: Primary | ICD-10-CM

## 2025-05-15 LAB
BILIRUBIN, URINE, POC: NEGATIVE
BLOOD URINE, POC: NORMAL
GLUCOSE URINE, POC: NEGATIVE
KETONES, URINE, POC: NEGATIVE
LEUKOCYTE ESTERASE, URINE, POC: NEGATIVE
NITRITE, URINE, POC: NEGATIVE
PH, URINE, POC: 6 (ref 4.6–8)
PROTEIN,URINE, POC: NEGATIVE
SPECIFIC GRAVITY, URINE, POC: 1.02 (ref 1–1.03)
URINALYSIS CLARITY, POC: CLEAR
URINALYSIS COLOR, POC: YELLOW
UROBILINOGEN, POC: NORMAL

## 2025-05-15 PROCEDURE — 3079F DIAST BP 80-89 MM HG: CPT

## 2025-05-15 PROCEDURE — 99213 OFFICE O/P EST LOW 20 MIN: CPT

## 2025-05-15 PROCEDURE — 3074F SYST BP LT 130 MM HG: CPT

## 2025-05-15 PROCEDURE — 81003 URINALYSIS AUTO W/O SCOPE: CPT

## 2025-05-15 ASSESSMENT — PATIENT HEALTH QUESTIONNAIRE - PHQ9
SUM OF ALL RESPONSES TO PHQ QUESTIONS 1-9: 2
2. FEELING DOWN, DEPRESSED OR HOPELESS: SEVERAL DAYS
1. LITTLE INTEREST OR PLEASURE IN DOING THINGS: SEVERAL DAYS
SUM OF ALL RESPONSES TO PHQ QUESTIONS 1-9: 2

## 2025-05-20 DIAGNOSIS — E78.00 PURE HYPERCHOLESTEROLEMIA, UNSPECIFIED: ICD-10-CM

## 2025-05-20 NOTE — PROGRESS NOTES
I reviewed with the resident the medical history and the resident's findings on the physical examination.  I discussed with the resident the patient's diagnosis and concur with the plan.    
Identified pt with two pt identifiers(name and ). Reviewed record in preparation for visit and have obtained necessary documentation.  Chief Complaint   Patient presents with    Abdominal Pain   Pt states he is here for right side pain that started Friday after he passed a kidney stone. He reports the time of the passing he has a little blood, but none since.  He reports his right side pain feels about the same now as it did before he passed the kidney stone. Denies any fevers, chills, nausea and vomiting.       Health Maintenance Due   Topic    Varicella vaccine (1 of 2 - 13+ 2-dose series)    HPV vaccine (1 - Male 3-dose series)    Hepatitis B vaccine (1 of 3 - 19+ 3-dose series)    DTaP/Tdap/Td vaccine (1 - Tdap)    Pneumococcal 0-49 years Vaccine (1 of 2 - PCV)    Lipids     A1C test (Diabetic or Prediabetic)     Depression Monitoring     COVID-19 Vaccine ( season)       Vitals:    05/15/25 0811   BP: 126/87   BP Site: Right Upper Arm   Patient Position: Sitting   BP Cuff Size: Medium Adult   Pulse: 90   Resp: 18   Temp: 98.1 °F (36.7 °C)   TempSrc: Oral   SpO2: 95%   Weight: 98.8 kg (217 lb 12.8 oz)   Height: 1.676 m (5' 6\")         \"Have you been to the ER, urgent care clinic since your last visit?  Hospitalized since your last visit?\"    NO    “Have you seen or consulted any other health care providers outside of [facility] since your last visit?”    Yes, 2025 [facility] for med management         Click Here for Release of Records Request     This patient is accompanied in the office by his self.  I have received verbal consent from Harley Brunson to discuss any/all medical information while they are present in the room.  
tablet by mouth daily    acetaminophen (TYLENOL) 500 MG tablet Take 2 tablets by mouth every 6 hours as needed    VYVANSE 40 MG CAPS Take 1 tablet by mouth daily. Max Daily Amount: 1 tablet    lurasidone (LATUDA) 40 MG TABS tablet Take 1 tablet by mouth nightly    busPIRone (BUSPAR) 7.5 MG tablet Take 1 tablet by mouth 2 times daily    pantoprazole (PROTONIX) 20 MG tablet Take 1 tablet by mouth daily    SUMAtriptan-Naproxen Sodium (TREXIMET)  MG TABS tablet TK 1 T PO D PRN    diclofenac sodium (VOLTAREN) 1 % GEL Apply 4 g topically 4 times daily (Patient not taking: Reported on 5/15/2025)    pregabalin (LYRICA) 75 MG capsule Take 1 capsule by mouth 2 times daily. (Patient not taking: Reported on 5/15/2025)     No current facility-administered medications for this visit.       Immunizations   Immunization History   Administered Date(s) Administered    Influenza Virus Vaccine 10/11/2020       Allergies   Allergies   Allergen Reactions    Gluten Anaphylaxis    Sulfa Antibiotics Anaphylaxis, Palpitations, Shortness Of Breath and Swelling    Topiramate Anaphylaxis    Trimethoprim Anaphylaxis    Zolmitriptan Shortness Of Breath    Doxycycline Nausea And Vomiting and Swelling       Objective   Vital Signs  /87 (BP Site: Right Upper Arm, Patient Position: Sitting, BP Cuff Size: Medium Adult)   Pulse 90   Temp 98.1 °F (36.7 °C) (Oral)   Resp 18   Ht 1.676 m (5' 6\")   Wt 98.8 kg (217 lb 12.8 oz)   SpO2 95%   BMI 35.15 kg/m²     Physical Exam  Vitals reviewed.   Constitutional:       General: He is not in acute distress.  HENT:      Head: Normocephalic and atraumatic.   Eyes:      Conjunctiva/sclera: Conjunctivae normal.      Pupils: Pupils are equal, round, and reactive to light.   Cardiovascular:      Rate and Rhythm: Normal rate and regular rhythm.      Heart sounds: Normal heart sounds. No murmur heard.     No gallop.   Pulmonary:      Effort: Pulmonary effort is normal. No respiratory distress.

## 2025-05-22 NOTE — TELEPHONE ENCOUNTER
Medication Refill Request    Harley Guzmánkoffi is requesting a refill of the following medication(s):   Requested Prescriptions     Pending Prescriptions Disp Refills    atorvastatin (LIPITOR) 40 MG tablet [Pharmacy Med Name: ATORVASTATIN 40MG TABLETS] 90 tablet 0     Sig: TAKE 1 TABLET BY MOUTH DAILY        Listed PCP is Steven Ardon MD   Last provider to prescribe medication: Dr. Ardon  Last Date of Medication Prescribed: 02/07/2025   Date of Last Office Visit at [facility]: 05/15/2025 with Dr. Gray      FUTURE APPOINTMENT: No future appointments.    Please send refill to:    [id number] [place], [place] - [address] - P [phone] - F [phone] [address] [address] [facility] [facility]  [facility] [address]  Phone: [phone] Fax: [phone]      Please review request and approve or deny with recommendations.

## 2025-05-27 RX ORDER — ATORVASTATIN CALCIUM 40 MG/1
40 TABLET, FILM COATED ORAL DAILY
Qty: 90 TABLET | Refills: 0 | Status: SHIPPED | OUTPATIENT
Start: 2025-05-27

## (undated) DEVICE — 1200 GUARD II KIT W/5MM TUBE W/O VAC TUBE: Brand: GUARDIAN

## (undated) DEVICE — KIT COLON W/ 1.1OZ LUB AND 2 END

## (undated) DEVICE — Device

## (undated) DEVICE — BITEBLOCK ENDOSCP 60FR MAXI WHT POLYETH STURDY W/ VELC WVN

## (undated) DEVICE — CONTAINER SPEC 20 ML LID NEUT BUFF FORMALIN 10 % POLYPR STS

## (undated) DEVICE — BAG SPEC BIOHZRD 10 X 10 IN --

## (undated) DEVICE — SET ADMIN 16ML TBNG L100IN 2 Y INJ SITE IV PIGGY BK DISP

## (undated) DEVICE — SOLIDIFIER MEDC 1200ML -- CONVERT TO 356117

## (undated) DEVICE — ADULT SPO2 SENSOR: Brand: NELLCOR

## (undated) DEVICE — BAG BELONG PT PERS CLEAR HANDL

## (undated) DEVICE — TUBING ADMIN SET INTRAV ARTERI -- CONVERT TO ITEM 340436

## (undated) DEVICE — 3M™ CUROS™ DISINFECTING CAP FOR NEEDLELESS CONNECTORS 270/CARTON 20 CARTONS/CASE CFF1-270: Brand: CUROS™

## (undated) DEVICE — KENDALL RADIOLUCENT FOAM MONITORING ELECTRODE -RECTANGULAR SHAPE: Brand: KENDALL

## (undated) DEVICE — CATH IV AUTOGRD BC BLU 22GA 25 -- INSYTE

## (undated) DEVICE — FORCEPS BX L240CM JAW DIA2.8MM L CAP W/ NDL MIC MESH TOOTH